# Patient Record
Sex: FEMALE | Race: NATIVE HAWAIIAN OR OTHER PACIFIC ISLANDER | NOT HISPANIC OR LATINO | Employment: UNEMPLOYED | URBAN - METROPOLITAN AREA
[De-identification: names, ages, dates, MRNs, and addresses within clinical notes are randomized per-mention and may not be internally consistent; named-entity substitution may affect disease eponyms.]

---

## 2023-09-14 ENCOUNTER — APPOINTMENT (OUTPATIENT)
Dept: RADIOLOGY | Facility: IMAGING CENTER | Age: 33
End: 2023-09-14
Payer: MEDICAID

## 2023-09-14 DIAGNOSIS — Z3A.20 20 WEEKS GESTATION OF PREGNANCY: ICD-10-CM

## 2023-09-14 PROCEDURE — 76805 OB US >/= 14 WKS SNGL FETUS: CPT | Mod: TC | Performed by: OBSTETRICS & GYNECOLOGY

## 2023-09-20 ENCOUNTER — APPOINTMENT (OUTPATIENT)
Dept: OBGYN | Facility: CLINIC | Age: 33
End: 2023-09-20

## 2023-09-20 ENCOUNTER — INITIAL PRENATAL (OUTPATIENT)
Dept: OBGYN | Facility: CLINIC | Age: 33
End: 2023-09-20

## 2023-09-20 ENCOUNTER — HOSPITAL ENCOUNTER (OUTPATIENT)
Facility: MEDICAL CENTER | Age: 33
End: 2023-09-20
Attending: NURSE PRACTITIONER
Payer: MEDICAID

## 2023-09-20 VITALS — BODY MASS INDEX: 32.44 KG/M2 | WEIGHT: 189 LBS | DIASTOLIC BLOOD PRESSURE: 60 MMHG | SYSTOLIC BLOOD PRESSURE: 100 MMHG

## 2023-09-20 DIAGNOSIS — Z34.82 ENCOUNTER FOR SUPERVISION OF OTHER NORMAL PREGNANCY IN SECOND TRIMESTER: ICD-10-CM

## 2023-09-20 DIAGNOSIS — Z34.82 ENCOUNTER FOR SUPERVISION OF OTHER NORMAL PREGNANCY IN SECOND TRIMESTER: Primary | ICD-10-CM

## 2023-09-20 PROBLEM — Z34.90 SUPERVISION OF NORMAL PREGNANCY: Status: ACTIVE | Noted: 2023-09-20

## 2023-09-20 PROCEDURE — 3078F DIAST BP <80 MM HG: CPT | Performed by: NURSE PRACTITIONER

## 2023-09-20 PROCEDURE — 87591 N.GONORRHOEAE DNA AMP PROB: CPT

## 2023-09-20 PROCEDURE — 88175 CYTOPATH C/V AUTO FLUID REDO: CPT

## 2023-09-20 PROCEDURE — 87624 HPV HI-RISK TYP POOLED RSLT: CPT

## 2023-09-20 PROCEDURE — 87491 CHLMYD TRACH DNA AMP PROBE: CPT

## 2023-09-20 PROCEDURE — 3074F SYST BP LT 130 MM HG: CPT | Performed by: NURSE PRACTITIONER

## 2023-09-20 PROCEDURE — 0501F PRENATAL FLOW SHEET: CPT | Performed by: NURSE PRACTITIONER

## 2023-09-20 ASSESSMENT — ENCOUNTER SYMPTOMS
RESPIRATORY NEGATIVE: 1
CONSTITUTIONAL NEGATIVE: 1
PSYCHIATRIC NEGATIVE: 1
NEUROLOGICAL NEGATIVE: 1
EYES NEGATIVE: 1
GASTROINTESTINAL NEGATIVE: 1
MUSCULOSKELETAL NEGATIVE: 1
CARDIOVASCULAR NEGATIVE: 1

## 2023-09-20 NOTE — PROGRESS NOTES
NOB today  LMP: sometime in march  Last pap: pap today  Phone # [phone] (home)    Pharmacy confirmed  On PNV  Genetic screening   c/o

## 2023-09-20 NOTE — PROGRESS NOTES
S:  Karlene Arriaza is a 32 y.o.  who presents for her new OB exam.  She is 25w2d with and LUCINDA of Estimated Date of Delivery: 24 based off of US . She has no complaints.  [place] Discussed heavy lifting and chemical exposure. No ER visits or previous care in this pregnancy.     US done and overall normal. Listed as 1% for EFW, but likely due to incorrect dating. However, HC is 2 wks > AC. Reviewed with patient, referral to perinatology.    Too late for AFP, declines NIPT.  Declines CF.  Denies VB, LOF, or cramping.  Denies dysuria, vaginal DC. Reports good fetal movement.     Pt is  and lives with  and children.  Pregnancy is unplanned but desired. Desires BTL after delivery.    She has a history of 5 full term 's without any pregnancy or delivery complications.      Discussed diet and exercise during pregnancy. Encouraged good nutrition, and daily exercise including walking or swimming. Discussed expected weight gain during pregnancy. Discussed adequate hydration during pregnancy.    Past Medical History:   Diagnosis Date    Pregnant      Family History   Problem Relation Age of Onset    No Known Problems Mother     Diabetes Father      Social History     Socioeconomic History    Marital status:      Spouse name: Not on file    Number of children: Not on file    Years of education: Not on file    Highest education level: Not on file   Occupational History    Not on file   Tobacco Use    Smoking status: Never    Smokeless tobacco: Not on file   Vaping Use    Vaping Use: Never used   Substance and Sexual Activity    Alcohol use: Not Currently    Drug use: Never    Sexual activity: Yes   Other Topics Concern    Not on file   Social History Narrative    Not on file     Social Determinants of Health     Financial Resource Strain: Not on file   Food Insecurity: Not on file   Transportation Needs: Not on file   Physical Activity: Not on file   Stress: Not on  file   Social Connections: Not on file   Intimate Partner Violence: Not on file   Housing Stability: Not on file     OB History    Para Term  AB Living   6 5 5     5   SAB IAB Ectopic Molar Multiple Live Births             5      # Outcome Date GA Lbr Driss/2nd Weight Sex Delivery Anes PTL Lv   6 Current            5 Term  40w0d  5 lb F Vag-Spont   SANDHYA   4 Term  40w0d  6 lb F Vag-Spont   SANDHYA   3 Term  40w0d  5 lb F Vag-Spont   SANDHYA   2 Term  40w0d  5 lb F Vag-Spont   SANDHYA   1 Term  40w0d  6 lb M Vag-Spont   SANDHYA       History of Varicella Virus: no  History of HSV I or II in self or partner: no  History of Thyroid problems: no    O:  /60   Wt 189 lb    See Prenatal Physical.    Wet mount: deferred, no s/sx  Chaperone offered: yes    A:   1.  IUP @ 25w2d per US        2.  S=D        3.  See problem list below        4.  Late prenatal care       Patient Active Problem List    Diagnosis Date Noted    Supervision of normal pregnancy 2023         P:  1.  GC/CT & pap done        2.  Prenatal labs ordered - lab slip given        3.  Discussed PNV, diet, avoidances and adequate water intake        4.  NOB packet given        5.  Return to office in 3 wks        6.  Complete OB US already done and WNL        7.  Glucose ordered today    Orders Placed This Encounter    PREG CNTR PRENATAL PN    URINE DRUG SCREEN    GLUCOSE 1HR GESTATIONAL    THINPREP PAP W/HPV AND CTNG    Referral to Perinatology    Prenatal MV-Min-Fe Fum-FA-DHA (PRENATAL 1 PO)        US done 2023, patient unsure of LMP, but states regular monthly cycles    If US dating is used for percentiles, EFW is 18%, HC 73%, BPD 84%, AC 13%, and cerebellum is equal to 24w5d.   Likely dating issue, but due to head and abdomen discrepancy, referral to perinatology      HPI    Review of Systems   Constitutional: Negative.    HENT: Negative.     Eyes: Negative.    Respiratory: Negative.     Cardiovascular: Negative.     Gastrointestinal: Negative.    Genitourinary: Negative.    Musculoskeletal: Negative.    Skin: Negative.    Neurological: Negative.    Endo/Heme/Allergies: Negative.    Psychiatric/Behavioral: Negative.     All other systems reviewed and are negative.      Objective     /60   Wt 189 lb   LMP 03/05/2023 (Approximate)   BMI 32.44 kg/m²      Physical Exam  Vitals and nursing note reviewed. Exam conducted with a chaperone present.   Constitutional:       Appearance: Normal appearance. She is normal weight.   HENT:      Head: Normocephalic.   Eyes:      Extraocular Movements: Extraocular movements intact.   Cardiovascular:      Rate and Rhythm: Normal rate and regular rhythm.      Pulses: Normal pulses.      Heart sounds: Normal heart sounds.   Pulmonary:      Effort: Pulmonary effort is normal.   Abdominal:      Palpations: Abdomen is soft.   Genitourinary:     General: Normal vulva.      Rectum: Normal.   Musculoskeletal:         General: Normal range of motion.      Cervical back: Normal range of motion and neck supple.   Skin:     General: Skin is warm and dry.      Capillary Refill: Capillary refill takes less than 2 seconds.   Neurological:      Mental Status: She is alert.   Psychiatric:         Mood and Affect: Mood normal.         Behavior: Behavior normal.         Thought Content: Thought content normal.         Judgment: Judgment normal.       Assessment & Plan       1. Encounter for supervision of other normal pregnancy in second trimester  LUCINDA 1/1/2024 per US

## 2023-09-23 LAB
C TRACH RRNA CVX QL NAA+PROBE: NEGATIVE
CYTOLOGIST CVX/VAG CYTO: ABNORMAL
CYTOLOGY CVX/VAG DOC CYTO: ABNORMAL
CYTOLOGY CVX/VAG DOC THIN PREP: ABNORMAL
DX ICD CODE: ABNORMAL
HPV I/H RISK 4 DNA CVX QL PROBE+SIG AMP: NEGATIVE
N GONORRHOEA RRNA CVX QL NAA+PROBE: NEGATIVE
NOTE NL11727A: ABNORMAL
OTHER STN SPEC: ABNORMAL
PATHOLOGIST CVX/VAG CYTO: ABNORMAL
STAT OF ADQ CVX/VAG CYTO-IMP: ABNORMAL

## 2023-10-03 ENCOUNTER — HOSPITAL ENCOUNTER (OUTPATIENT)
Dept: LAB | Facility: MEDICAL CENTER | Age: 33
End: 2023-10-03
Attending: NURSE PRACTITIONER
Payer: MEDICAID

## 2023-10-03 DIAGNOSIS — Z34.82 ENCOUNTER FOR SUPERVISION OF OTHER NORMAL PREGNANCY IN SECOND TRIMESTER: ICD-10-CM

## 2023-10-03 LAB
ABO GROUP BLD: NORMAL
BLD GP AB SCN SERPL QL: NORMAL
ERYTHROCYTE [DISTWIDTH] IN BLOOD BY AUTOMATED COUNT: 45.9 FL (ref 35.9–50)
GLUCOSE 1H P 50 G GLC PO SERPL-MCNC: 93 MG/DL (ref 70–139)
HBV SURFACE AG SER QL: ABNORMAL
HCT VFR BLD AUTO: 33.9 % (ref 37–47)
HCV AB SER QL: ABNORMAL
HGB BLD-MCNC: 10.6 G/DL (ref 12–16)
HIV 1+2 AB+HIV1 P24 AG SERPL QL IA: NORMAL
MCH RBC QN AUTO: 26 PG (ref 27–33)
MCHC RBC AUTO-ENTMCNC: 31.3 G/DL (ref 32.2–35.5)
MCV RBC AUTO: 83.3 FL (ref 81.4–97.8)
PLATELET # BLD AUTO: 275 K/UL (ref 164–446)
PMV BLD AUTO: 10.3 FL (ref 9–12.9)
RBC # BLD AUTO: 4.07 M/UL (ref 4.2–5.4)
RH BLD: NORMAL
RUBV AB SER QL: 11 IU/ML
T PALLIDUM AB SER QL IA: ABNORMAL
WBC # BLD AUTO: 8 K/UL (ref 4.8–10.8)

## 2023-10-03 PROCEDURE — 86900 BLOOD TYPING SEROLOGIC ABO: CPT

## 2023-10-03 PROCEDURE — 86901 BLOOD TYPING SEROLOGIC RH(D): CPT

## 2023-10-03 PROCEDURE — 87086 URINE CULTURE/COLONY COUNT: CPT

## 2023-10-03 PROCEDURE — 86762 RUBELLA ANTIBODY: CPT

## 2023-10-03 PROCEDURE — 87389 HIV-1 AG W/HIV-1&-2 AB AG IA: CPT

## 2023-10-03 PROCEDURE — 82950 GLUCOSE TEST: CPT

## 2023-10-03 PROCEDURE — 86850 RBC ANTIBODY SCREEN: CPT

## 2023-10-03 PROCEDURE — 36415 COLL VENOUS BLD VENIPUNCTURE: CPT

## 2023-10-03 PROCEDURE — 80307 DRUG TEST PRSMV CHEM ANLYZR: CPT

## 2023-10-03 PROCEDURE — 86803 HEPATITIS C AB TEST: CPT

## 2023-10-03 PROCEDURE — 87340 HEPATITIS B SURFACE AG IA: CPT

## 2023-10-03 PROCEDURE — 85027 COMPLETE CBC AUTOMATED: CPT

## 2023-10-03 PROCEDURE — 86780 TREPONEMA PALLIDUM: CPT

## 2023-10-05 LAB
AMPHET CTO UR CFM-MCNC: NEGATIVE NG/ML
BACTERIA UR CULT: NORMAL
BARBITURATES CTO UR CFM-MCNC: NEGATIVE NG/ML
BENZODIAZ CTO UR CFM-MCNC: NEGATIVE NG/ML
CANNABINOIDS CTO UR CFM-MCNC: NEGATIVE NG/ML
COCAINE CTO UR CFM-MCNC: NEGATIVE NG/ML
DRUG COMMENT 753798: NORMAL
METHADONE CTO UR CFM-MCNC: NEGATIVE NG/ML
OPIATES CTO UR CFM-MCNC: NEGATIVE NG/ML
PCP CTO UR CFM-MCNC: NEGATIVE NG/ML
PROPOXYPH CTO UR CFM-MCNC: NEGATIVE NG/ML
SIGNIFICANT IND 70042: NORMAL
SITE SITE: NORMAL
SOURCE SOURCE: NORMAL

## 2023-10-09 ENCOUNTER — ROUTINE PRENATAL (OUTPATIENT)
Dept: OBGYN | Facility: CLINIC | Age: 33
End: 2023-10-09
Payer: MEDICAID

## 2023-10-09 ENCOUNTER — TELEPHONE (OUTPATIENT)
Dept: OBGYN | Facility: CLINIC | Age: 33
End: 2023-10-09

## 2023-10-09 VITALS — BODY MASS INDEX: 32.61 KG/M2 | WEIGHT: 190 LBS | DIASTOLIC BLOOD PRESSURE: 70 MMHG | SYSTOLIC BLOOD PRESSURE: 110 MMHG

## 2023-10-09 DIAGNOSIS — O09.30 LATE PRENATAL CARE: ICD-10-CM

## 2023-10-09 DIAGNOSIS — Z64.1 GRAND MULTIPARA: ICD-10-CM

## 2023-10-09 DIAGNOSIS — O09.522 MULTIGRAVIDA OF ADVANCED MATERNAL AGE IN SECOND TRIMESTER: ICD-10-CM

## 2023-10-09 DIAGNOSIS — R87.610 ASCUS OF CERVIX WITH NEGATIVE HIGH RISK HPV: ICD-10-CM

## 2023-10-09 PROBLEM — O09.529 ADVANCED MATERNAL AGE IN MULTIGRAVIDA: Status: ACTIVE | Noted: 2023-10-09

## 2023-10-09 PROCEDURE — 90472 IMMUNIZATION ADMIN EACH ADD: CPT | Performed by: NURSE PRACTITIONER

## 2023-10-09 PROCEDURE — 3074F SYST BP LT 130 MM HG: CPT | Performed by: NURSE PRACTITIONER

## 2023-10-09 PROCEDURE — 3078F DIAST BP <80 MM HG: CPT | Performed by: NURSE PRACTITIONER

## 2023-10-09 PROCEDURE — 0502F SUBSEQUENT PRENATAL CARE: CPT | Performed by: NURSE PRACTITIONER

## 2023-10-09 PROCEDURE — 90686 IIV4 VACC NO PRSV 0.5 ML IM: CPT | Performed by: NURSE PRACTITIONER

## 2023-10-09 PROCEDURE — 90715 TDAP VACCINE 7 YRS/> IM: CPT | Performed by: NURSE PRACTITIONER

## 2023-10-09 PROCEDURE — 90471 IMMUNIZATION ADMIN: CPT | Performed by: NURSE PRACTITIONER

## 2023-10-09 NOTE — PROGRESS NOTES
Pt. Here for OB F/U.   Reports good FM.   Pt. Denies VB, LOF, or UC's.   MINERVA explained and given today.  Tdap and Flu given.   BTL signed.   Chaperone offered.   Pt states she has no concerns.

## 2023-10-09 NOTE — LETTER
"Count Your Baby's Movements  Another step to a healthy delivery      How Many Weeks Pregnant? 27w6d    Date to Begin Counting: Today!              How to use this chart    One way for your physician to keep track of your baby's health is by knowing how often the baby moves (or \"kicks\") in your womb.  You can help your physician to do this by using this chart every day.    Every day, you should see how many hours it takes for your baby to move 10 times.  Start in the morning, as soon as you get up.    First, write down the time your baby moves until you get to 10.  Check off one box every time your baby moves until you get to 10.  Write down the time you finished counting in the last column.  Total how long it took to count up all 10 movements.  Finally, fill in the box that shows how long this took.  After counting 10 movements, you no longer have to count any more that day.  The next morning, just start counting again as soon as you get up.    What should you call a \"movement\"?  It is hard to say, because it will feel different from one mother to another and from one pregnancy to the next.  The important thing is that you count the movements the same way throughout your pregnancy.  If you have more questions, you should ask your physician.    Count carefully every day!  SAMPLE:  Week 28    How many hours did it take to feel 10 movements?       Start  Time     1     2     3     4     5     6     7     8     9     10   Finish Time   Mon 8:20           11:40   Tue Wed Thu               Fri               Sat               Sun                 IMPORTANT: You should contact your physician if it takes more than two hours for you to feel 10 movements.  Each morning, write down the time and start to count the movements of your baby.  Keep track by checking off one box every time you feel one movement.  When you have felt 10 \"kicks\", write down the time you finished counting in the last column.  " Then fill in the   box (over the check radha) for the number of hours it took.  Be sure to read the complete instructions on the previous page.

## 2023-10-09 NOTE — PROGRESS NOTES
SUBJECTIVE:  Pt is a 33 y.o.   at 27w6d  gestation. Presents today for follow-up prenatal care. Reports good  fetal movement. Denies regular cramping/contractions, bleeding or leaking of fluid. Denies dysuria, headaches, N/V. Generally feels well today.      OBJECTIVE:  - See prenatal vitals flow  -   Vitals:    10/09/23 0855   BP: 110/70   Weight: 190 lb                 ASSESSMENT:   - IUP at 27w6d    - S=D   -   Patient Active Problem List    Diagnosis Date Noted    Advanced maternal age in multigravida 10/09/2023    Grand multipara 10/09/2023    Late prenatal care @ 25 weeks 10/09/2023    ASCUS of cervix with negative high risk HPV 10/09/2023         PLAN:  - S/sx pregnancy and labor warning signs vs general discomforts discussed  - Fetal movements and/or kick counts reviewed   - Adequate hydration reinforced  - Nutrition/exercise/vitamin education; continue PNV  -  Has not gotten call from [facility] for appt, will have RN coordinate this  - S/p Tdap and flu vacc today  - Anticipatory guidance given  - RTC in 3 weeks for follow-up prenatal care

## 2023-10-09 NOTE — TELEPHONE ENCOUNTER
10/9/2023 1114  RODGER Davis asked this RN to call pt and f/u on referral to [facility]. Pt has not heard from the office yet. Gave pt phone number [phone] to [facility] office. Encouraged her to call and set up appt. Pt agreed and verbalized understanding.

## 2023-11-03 ENCOUNTER — ROUTINE PRENATAL (OUTPATIENT)
Dept: OBGYN | Facility: CLINIC | Age: 33
End: 2023-11-03
Payer: MEDICAID

## 2023-11-03 VITALS — SYSTOLIC BLOOD PRESSURE: 110 MMHG | BODY MASS INDEX: 34.16 KG/M2 | DIASTOLIC BLOOD PRESSURE: 70 MMHG | WEIGHT: 199 LBS

## 2023-11-03 DIAGNOSIS — O09.523 MULTIGRAVIDA OF ADVANCED MATERNAL AGE IN THIRD TRIMESTER: ICD-10-CM

## 2023-11-03 PROCEDURE — 3078F DIAST BP <80 MM HG: CPT | Performed by: NURSE PRACTITIONER

## 2023-11-03 PROCEDURE — 0502F SUBSEQUENT PRENATAL CARE: CPT | Performed by: NURSE PRACTITIONER

## 2023-11-03 PROCEDURE — 3074F SYST BP LT 130 MM HG: CPT | Performed by: NURSE PRACTITIONER

## 2023-11-03 NOTE — PROGRESS NOTES
Pt. Here for OB/FU.   Reports Good FM.   Pt. Denies VB, LOF, or UC's.   Chaperone offered.   BTL signed.   Pt states she would like to know what position baby is in.

## 2023-11-03 NOTE — PROGRESS NOTES
SUBJECTIVE:  Pt is a 33 y.o.   at 31w3d  gestation. Presents today for follow-up prenatal care. Reports good  fetal movement. Denies regular cramping/contractions, bleeding or leaking of fluid. Denies dysuria, headaches, N/V. Generally feels well today .     OBJECTIVE:  - See prenatal vitals flow  -   Vitals:    23 0732   BP: 110/70   Weight: 199 lb                 ASSESSMENT:   - IUP at 31w3d    - S=D   -   Patient Active Problem List    Diagnosis Date Noted    Advanced maternal age in multigravida 10/09/2023    Grand multipara 10/09/2023    Late prenatal care @ 25 weeks 10/09/2023    ASCUS of cervix with negative high risk HPV 10/09/2023         PLAN:  - S/sx pregnancy and labor warning signs vs general discomforts discussed  - Fetal movements and/or kick counts reviewed   - Adequate hydration reinforced  - Nutrition/exercise/vitamin education; continue PNV  - Desires BTL if c/s  - RN will help pt get appt with MFM as still has not been able to  - Anticipatory guidance given  - RTC in 2 weeks for follow-up prenatal care

## 2023-11-08 ENCOUNTER — TELEPHONE (OUTPATIENT)
Dept: OBGYN | Facility: CLINIC | Age: 33
End: 2023-11-08
Payer: MEDICAID

## 2023-11-08 NOTE — TELEPHONE ENCOUNTER
11/8/2023 1130  Called pt to see if she had gotten a call from Dr Michel's office. She had not. Gave pt phone number [phone] to office. Pt verbalized understanding.

## 2023-11-22 ENCOUNTER — ROUTINE PRENATAL (OUTPATIENT)
Dept: OBGYN | Facility: CLINIC | Age: 33
End: 2023-11-22
Payer: MEDICAID

## 2023-11-22 VITALS — WEIGHT: 208 LBS | BODY MASS INDEX: 35.7 KG/M2 | DIASTOLIC BLOOD PRESSURE: 100 MMHG | SYSTOLIC BLOOD PRESSURE: 126 MMHG

## 2023-11-22 DIAGNOSIS — O09.523 MULTIGRAVIDA OF ADVANCED MATERNAL AGE IN THIRD TRIMESTER: ICD-10-CM

## 2023-11-22 PROCEDURE — 3080F DIAST BP >= 90 MM HG: CPT | Performed by: NURSE PRACTITIONER

## 2023-11-22 PROCEDURE — 0502F SUBSEQUENT PRENATAL CARE: CPT | Performed by: NURSE PRACTITIONER

## 2023-11-22 PROCEDURE — 3074F SYST BP LT 130 MM HG: CPT | Performed by: NURSE PRACTITIONER

## 2023-11-22 RX ORDER — FERROUS SULFATE 325(65) MG
325 TABLET ORAL DAILY
COMMUNITY

## 2023-11-22 NOTE — PROGRESS NOTES
SUBJECTIVE:  Pt is a 33 y.o.   at 34w1d  gestation. Presents today for follow-up prenatal care. Reports good  fetal movement. Denies regular cramping/contractions, bleeding or leaking of fluid. Denies dysuria, headaches, N/V. Generally feels well today.     OBJECTIVE:  - See prenatal vitals flow     ASSESSMENT:   - IUP at 34w1d    - S=D   -   Patient Active Problem List    Diagnosis Date Noted    Advanced maternal age in multigravida 10/09/2023    Grand multipara 10/09/2023    Late prenatal care @ 25 weeks 10/09/2023    ASCUS of cervix with negative high risk HPV 10/09/2023         PLAN:  - S/sx pregnancy and labor warning signs vs general discomforts discussed  - Fetal movements and/or kick counts reviewed   - Adequate hydration reinforced  - Nutrition/exercise/vitamin education; continue PNV  - Anticipatory guidance given  - RTC in 2 weeks for follow-up prenatal care/NST

## 2023-11-22 NOTE — PROGRESS NOTES
Pt. Here for OB/FU.   Reports Good FM.   Pt. Denies VB or LOF.   Chaperone offered and indicated.   BP Retake : 124/84  Labs up to date.   Pt states she has been having some contractions here and there.

## 2023-11-28 ENCOUNTER — ANESTHESIA (OUTPATIENT)
Dept: ANESTHESIOLOGY | Facility: MEDICAL CENTER | Age: 33
End: 2023-11-28
Payer: MEDICAID

## 2023-11-28 ENCOUNTER — ROUTINE PRENATAL (OUTPATIENT)
Dept: OBGYN | Facility: CLINIC | Age: 33
End: 2023-11-28

## 2023-11-28 ENCOUNTER — HOSPITAL ENCOUNTER (INPATIENT)
Facility: MEDICAL CENTER | Age: 33
LOS: 3 days | End: 2023-12-01
Attending: OBSTETRICS & GYNECOLOGY | Admitting: OBSTETRICS & GYNECOLOGY
Payer: MEDICAID

## 2023-11-28 ENCOUNTER — ANESTHESIA EVENT (OUTPATIENT)
Dept: ANESTHESIOLOGY | Facility: MEDICAL CENTER | Age: 33
End: 2023-11-28
Payer: MEDICAID

## 2023-11-28 VITALS — BODY MASS INDEX: 37.08 KG/M2 | DIASTOLIC BLOOD PRESSURE: 92 MMHG | WEIGHT: 216 LBS | SYSTOLIC BLOOD PRESSURE: 140 MMHG

## 2023-11-28 DIAGNOSIS — Z91.89 AT RISK FOR INEFFECTIVE BREASTFEEDING: Primary | ICD-10-CM

## 2023-11-28 DIAGNOSIS — O13.3 GESTATIONAL HYPERTENSION, THIRD TRIMESTER: ICD-10-CM

## 2023-11-28 DIAGNOSIS — Z34.83 ENCOUNTER FOR SUPERVISION OF OTHER NORMAL PREGNANCY IN THIRD TRIMESTER: Primary | ICD-10-CM

## 2023-11-28 LAB
ABO GROUP BLD: NORMAL
ALBUMIN SERPL BCP-MCNC: 3.2 G/DL (ref 3.2–4.9)
ALBUMIN/GLOB SERPL: 0.9 G/DL
ALP SERPL-CCNC: 111 U/L (ref 30–99)
ALT SERPL-CCNC: 7 U/L (ref 2–50)
AMPHET UR QL SCN: NEGATIVE
ANION GAP SERPL CALC-SCNC: 12 MMOL/L (ref 7–16)
AST SERPL-CCNC: 17 U/L (ref 12–45)
BARBITURATES UR QL SCN: NEGATIVE
BARCODED ABORH UBTYP: 6200
BARCODED PRD CODE UBPRD: NORMAL
BARCODED UNIT NUM UBUNT: NORMAL
BASOPHILS # BLD AUTO: 0.5 % (ref 0–1.8)
BASOPHILS # BLD: 0.05 K/UL (ref 0–0.12)
BENZODIAZ UR QL SCN: NEGATIVE
BILIRUB SERPL-MCNC: 0.5 MG/DL (ref 0.1–1.5)
BLD GP AB SCN SERPL QL: NORMAL
BUN SERPL-MCNC: 11 MG/DL (ref 8–22)
BZE UR QL SCN: NEGATIVE
CALCIUM ALBUM COR SERPL-MCNC: 9.2 MG/DL (ref 8.5–10.5)
CALCIUM SERPL-MCNC: 8.6 MG/DL (ref 8.5–10.5)
CANNABINOIDS UR QL SCN: NEGATIVE
CHLORIDE SERPL-SCNC: 106 MMOL/L (ref 96–112)
CO2 SERPL-SCNC: 18 MMOL/L (ref 20–33)
COMPONENT R 8504R: NORMAL
CREAT SERPL-MCNC: 0.65 MG/DL (ref 0.5–1.4)
CREAT UR-MCNC: 307.8 MG/DL
EOSINOPHIL # BLD AUTO: 0.25 K/UL (ref 0–0.51)
EOSINOPHIL NFR BLD: 2.5 % (ref 0–6.9)
ERYTHROCYTE [DISTWIDTH] IN BLOOD BY AUTOMATED COUNT: 44.1 FL (ref 35.9–50)
FENTANYL UR QL: NEGATIVE
FLUAV RNA SPEC QL NAA+PROBE: NEGATIVE
FLUBV RNA SPEC QL NAA+PROBE: NEGATIVE
GFR SERPLBLD CREATININE-BSD FMLA CKD-EPI: 118 ML/MIN/1.73 M 2
GLOBULIN SER CALC-MCNC: 3.4 G/DL (ref 1.9–3.5)
GLUCOSE SERPL-MCNC: 62 MG/DL (ref 65–99)
GP B STREP DNA SPEC QL NAA+PROBE: NEGATIVE
HCT VFR BLD AUTO: 30.9 % (ref 37–47)
HGB BLD-MCNC: 9.6 G/DL (ref 12–16)
IMM GRANULOCYTES # BLD AUTO: 0.08 K/UL (ref 0–0.11)
IMM GRANULOCYTES NFR BLD AUTO: 0.8 % (ref 0–0.9)
LYMPHOCYTES # BLD AUTO: 0.98 K/UL (ref 1–4.8)
LYMPHOCYTES NFR BLD: 9.8 % (ref 22–41)
MAGNESIUM SERPL-MCNC: 1.9 MG/DL (ref 1.5–2.5)
MAGNESIUM SERPL-MCNC: 4.2 MG/DL (ref 1.5–2.5)
MCH RBC QN AUTO: 23.9 PG (ref 27–33)
MCHC RBC AUTO-ENTMCNC: 31.1 G/DL (ref 32.2–35.5)
MCV RBC AUTO: 77.1 FL (ref 81.4–97.8)
METHADONE UR QL SCN: NEGATIVE
MONOCYTES # BLD AUTO: 0.69 K/UL (ref 0–0.85)
MONOCYTES NFR BLD AUTO: 6.9 % (ref 0–13.4)
NEUTROPHILS # BLD AUTO: 8 K/UL (ref 1.82–7.42)
NEUTROPHILS NFR BLD: 79.5 % (ref 44–72)
NRBC # BLD AUTO: 0 K/UL
NRBC BLD-RTO: 0 /100 WBC (ref 0–0.2)
OPIATES UR QL SCN: NEGATIVE
OXYCODONE UR QL SCN: NEGATIVE
PCP UR QL SCN: NEGATIVE
PLATELET # BLD AUTO: 258 K/UL (ref 164–446)
PMV BLD AUTO: 10.1 FL (ref 9–12.9)
POTASSIUM SERPL-SCNC: 3.8 MMOL/L (ref 3.6–5.5)
PRODUCT TYPE UPROD: NORMAL
PROPOXYPH UR QL SCN: NEGATIVE
PROT SERPL-MCNC: 6.6 G/DL (ref 6–8.2)
PROT UR-MCNC: >600 MG/DL (ref 0–15)
PROT/CREAT UR: ABNORMAL MG/G (ref 10–107)
RBC # BLD AUTO: 4.01 M/UL (ref 4.2–5.4)
RH BLD: NORMAL
RSV RNA SPEC QL NAA+PROBE: NEGATIVE
SARS-COV-2 RNA RESP QL NAA+PROBE: DETECTED
SODIUM SERPL-SCNC: 136 MMOL/L (ref 135–145)
SPECIMEN SOURCE: ABNORMAL
T PALLIDUM AB SER QL IA: NORMAL
UNIT STATUS USTAT: NORMAL
WBC # BLD AUTO: 10.1 K/UL (ref 4.8–10.8)

## 2023-11-28 PROCEDURE — A9270 NON-COVERED ITEM OR SERVICE: HCPCS | Mod: UD | Performed by: OBSTETRICS & GYNECOLOGY

## 2023-11-28 PROCEDURE — 83735 ASSAY OF MAGNESIUM: CPT

## 2023-11-28 PROCEDURE — 700102 HCHG RX REV CODE 250 W/ 637 OVERRIDE(OP): Mod: UD

## 2023-11-28 PROCEDURE — 87081 CULTURE SCREEN ONLY: CPT

## 2023-11-28 PROCEDURE — 36415 COLL VENOUS BLD VENIPUNCTURE: CPT

## 2023-11-28 PROCEDURE — 3080F DIAST BP >= 90 MM HG: CPT | Performed by: NURSE PRACTITIONER

## 2023-11-28 PROCEDURE — 10907ZC DRAINAGE OF AMNIOTIC FLUID, THERAPEUTIC FROM PRODUCTS OF CONCEPTION, VIA NATURAL OR ARTIFICIAL OPENING: ICD-10-PCS | Performed by: OBSTETRICS & GYNECOLOGY

## 2023-11-28 PROCEDURE — 3077F SYST BP >= 140 MM HG: CPT | Performed by: NURSE PRACTITIONER

## 2023-11-28 PROCEDURE — 80053 COMPREHEN METABOLIC PANEL: CPT

## 2023-11-28 PROCEDURE — 700102 HCHG RX REV CODE 250 W/ 637 OVERRIDE(OP): Performed by: NURSE PRACTITIONER

## 2023-11-28 PROCEDURE — 700111 HCHG RX REV CODE 636 W/ 250 OVERRIDE (IP): Performed by: ANESTHESIOLOGY

## 2023-11-28 PROCEDURE — 86900 BLOOD TYPING SEROLOGIC ABO: CPT

## 2023-11-28 PROCEDURE — 770002 HCHG ROOM/CARE - OB PRIVATE (112)

## 2023-11-28 PROCEDURE — 700111 HCHG RX REV CODE 636 W/ 250 OVERRIDE (IP): Performed by: OBSTETRICS & GYNECOLOGY

## 2023-11-28 PROCEDURE — 303615 HCHG EPIDURAL/SPINAL ANESTHESIA FOR LABOR

## 2023-11-28 PROCEDURE — 3E033VJ INTRODUCTION OF OTHER HORMONE INTO PERIPHERAL VEIN, PERCUTANEOUS APPROACH: ICD-10-PCS | Performed by: OBSTETRICS & GYNECOLOGY

## 2023-11-28 PROCEDURE — 0502F SUBSEQUENT PRENATAL CARE: CPT | Performed by: NURSE PRACTITIONER

## 2023-11-28 PROCEDURE — 82570 ASSAY OF URINE CREATININE: CPT

## 2023-11-28 PROCEDURE — 700101 HCHG RX REV CODE 250: Performed by: OBSTETRICS & GYNECOLOGY

## 2023-11-28 PROCEDURE — A9270 NON-COVERED ITEM OR SERVICE: HCPCS | Mod: UD

## 2023-11-28 PROCEDURE — A9270 NON-COVERED ITEM OR SERVICE: HCPCS | Performed by: NURSE PRACTITIONER

## 2023-11-28 PROCEDURE — 87150 DNA/RNA AMPLIFIED PROBE: CPT

## 2023-11-28 PROCEDURE — 700105 HCHG RX REV CODE 258: Performed by: OBSTETRICS & GYNECOLOGY

## 2023-11-28 PROCEDURE — 86850 RBC ANTIBODY SCREEN: CPT

## 2023-11-28 PROCEDURE — 0241U HCHG SARS-COV-2 COVID-19 NFCT DS RESP RNA 4 TRGT MIC: CPT

## 2023-11-28 PROCEDURE — 700105 HCHG RX REV CODE 258: Performed by: ANESTHESIOLOGY

## 2023-11-28 PROCEDURE — 87653 STREP B DNA AMP PROBE: CPT

## 2023-11-28 PROCEDURE — C9803 HOPD COVID-19 SPEC COLLECT: HCPCS

## 2023-11-28 PROCEDURE — 700102 HCHG RX REV CODE 250 W/ 637 OVERRIDE(OP): Mod: UD | Performed by: OBSTETRICS & GYNECOLOGY

## 2023-11-28 PROCEDURE — 86780 TREPONEMA PALLIDUM: CPT

## 2023-11-28 PROCEDURE — 86901 BLOOD TYPING SEROLOGIC RH(D): CPT

## 2023-11-28 PROCEDURE — 85025 COMPLETE CBC W/AUTO DIFF WBC: CPT

## 2023-11-28 PROCEDURE — 700101 HCHG RX REV CODE 250: Performed by: ANESTHESIOLOGY

## 2023-11-28 PROCEDURE — 80307 DRUG TEST PRSMV CHEM ANLYZR: CPT

## 2023-11-28 PROCEDURE — 700111 HCHG RX REV CODE 636 W/ 250 OVERRIDE (IP): Mod: JZ

## 2023-11-28 PROCEDURE — 84156 ASSAY OF PROTEIN URINE: CPT

## 2023-11-28 RX ORDER — BUPIVACAINE HYDROCHLORIDE 2.5 MG/ML
INJECTION, SOLUTION EPIDURAL; INFILTRATION; INTRACAUDAL
Status: COMPLETED | OUTPATIENT
Start: 2023-11-28 | End: 2023-11-28

## 2023-11-28 RX ORDER — IBUPROFEN 800 MG/1
800 TABLET ORAL
Status: COMPLETED | OUTPATIENT
Start: 2023-11-28 | End: 2023-11-29

## 2023-11-28 RX ORDER — OXYTOCIN 10 [USP'U]/ML
10 INJECTION, SOLUTION INTRAMUSCULAR; INTRAVENOUS
Status: DISCONTINUED | OUTPATIENT
Start: 2023-11-28 | End: 2023-11-29 | Stop reason: HOSPADM

## 2023-11-28 RX ORDER — SODIUM CHLORIDE, SODIUM LACTATE, POTASSIUM CHLORIDE, CALCIUM CHLORIDE 600; 310; 30; 20 MG/100ML; MG/100ML; MG/100ML; MG/100ML
INJECTION, SOLUTION INTRAVENOUS CONTINUOUS
Status: DISCONTINUED | OUTPATIENT
Start: 2023-11-28 | End: 2023-11-29

## 2023-11-28 RX ORDER — ACETAMINOPHEN 325 MG/1
650 TABLET ORAL ONCE
Status: COMPLETED | OUTPATIENT
Start: 2023-11-28 | End: 2023-11-28

## 2023-11-28 RX ORDER — TERBUTALINE SULFATE 1 MG/ML
0.25 INJECTION, SOLUTION SUBCUTANEOUS
Status: DISCONTINUED | OUTPATIENT
Start: 2023-11-28 | End: 2023-11-29 | Stop reason: HOSPADM

## 2023-11-28 RX ORDER — MISOPROSTOL 200 UG/1
800 TABLET ORAL
Status: DISCONTINUED | OUTPATIENT
Start: 2023-11-28 | End: 2023-11-29

## 2023-11-28 RX ORDER — LABETALOL HYDROCHLORIDE 5 MG/ML
20-80 INJECTION, SOLUTION INTRAVENOUS
Status: DISCONTINUED | OUTPATIENT
Start: 2023-11-28 | End: 2023-11-29 | Stop reason: HOSPADM

## 2023-11-28 RX ORDER — BUPIVACAINE HYDROCHLORIDE 2.5 MG/ML
INJECTION, SOLUTION EPIDURAL; INFILTRATION; INTRACAUDAL
Status: COMPLETED
Start: 2023-11-28 | End: 2023-11-28

## 2023-11-28 RX ORDER — SODIUM CHLORIDE, SODIUM LACTATE, POTASSIUM CHLORIDE, AND CALCIUM CHLORIDE .6; .31; .03; .02 G/100ML; G/100ML; G/100ML; G/100ML
250 INJECTION, SOLUTION INTRAVENOUS PRN
Status: DISCONTINUED | OUTPATIENT
Start: 2023-11-28 | End: 2023-11-29 | Stop reason: HOSPADM

## 2023-11-28 RX ORDER — EPHEDRINE SULFATE 50 MG/ML
5 INJECTION, SOLUTION INTRAVENOUS
Status: DISCONTINUED | OUTPATIENT
Start: 2023-11-28 | End: 2023-11-29 | Stop reason: HOSPADM

## 2023-11-28 RX ORDER — CALCIUM GLUCONATE 94 MG/ML
1 INJECTION, SOLUTION INTRAVENOUS
Status: DISCONTINUED | OUTPATIENT
Start: 2023-11-28 | End: 2023-11-29 | Stop reason: HOSPADM

## 2023-11-28 RX ORDER — MAGNESIUM SULFATE HEPTAHYDRATE 40 MG/ML
4 INJECTION, SOLUTION INTRAVENOUS ONCE
Status: COMPLETED | OUTPATIENT
Start: 2023-11-28 | End: 2023-11-28

## 2023-11-28 RX ORDER — LIDOCAINE HYDROCHLORIDE 10 MG/ML
20 INJECTION, SOLUTION INFILTRATION; PERINEURAL
Status: DISCONTINUED | OUTPATIENT
Start: 2023-11-28 | End: 2023-11-29 | Stop reason: HOSPADM

## 2023-11-28 RX ORDER — ACETAMINOPHEN 500 MG
1000 TABLET ORAL ONCE
Status: COMPLETED | OUTPATIENT
Start: 2023-11-28 | End: 2023-11-28

## 2023-11-28 RX ORDER — SODIUM CHLORIDE, SODIUM LACTATE, POTASSIUM CHLORIDE, AND CALCIUM CHLORIDE .6; .31; .03; .02 G/100ML; G/100ML; G/100ML; G/100ML
1000 INJECTION, SOLUTION INTRAVENOUS
Status: COMPLETED | OUTPATIENT
Start: 2023-11-28 | End: 2023-11-28

## 2023-11-28 RX ORDER — MAGNESIUM SULFATE HEPTAHYDRATE 40 MG/ML
2 INJECTION, SOLUTION INTRAVENOUS CONTINUOUS
Status: DISCONTINUED | OUTPATIENT
Start: 2023-11-28 | End: 2023-11-29

## 2023-11-28 RX ORDER — HYDRALAZINE HYDROCHLORIDE 20 MG/ML
5-10 INJECTION INTRAMUSCULAR; INTRAVENOUS
Status: DISCONTINUED | OUTPATIENT
Start: 2023-11-28 | End: 2023-11-29 | Stop reason: HOSPADM

## 2023-11-28 RX ORDER — LIDOCAINE HYDROCHLORIDE AND EPINEPHRINE 15; 5 MG/ML; UG/ML
INJECTION, SOLUTION EPIDURAL
Status: COMPLETED | OUTPATIENT
Start: 2023-11-28 | End: 2023-11-28

## 2023-11-28 RX ORDER — ROPIVACAINE HYDROCHLORIDE 2 MG/ML
INJECTION, SOLUTION EPIDURAL; INFILTRATION; PERINEURAL
Status: COMPLETED
Start: 2023-11-28 | End: 2023-11-28

## 2023-11-28 RX ORDER — ACETAMINOPHEN 500 MG
1000 TABLET ORAL
Status: DISCONTINUED | OUTPATIENT
Start: 2023-11-28 | End: 2023-11-29 | Stop reason: HOSPADM

## 2023-11-28 RX ORDER — NIFEDIPINE 10 MG/1
10 CAPSULE ORAL
Status: DISCONTINUED | OUTPATIENT
Start: 2023-11-28 | End: 2023-11-29 | Stop reason: HOSPADM

## 2023-11-28 RX ORDER — CARBOPROST TROMETHAMINE 250 UG/ML
250 INJECTION, SOLUTION INTRAMUSCULAR
Status: DISCONTINUED | OUTPATIENT
Start: 2023-11-28 | End: 2023-12-01 | Stop reason: HOSPADM

## 2023-11-28 RX ORDER — ROPIVACAINE HYDROCHLORIDE 2 MG/ML
INJECTION, SOLUTION EPIDURAL; INFILTRATION; PERINEURAL CONTINUOUS
Status: DISCONTINUED | OUTPATIENT
Start: 2023-11-28 | End: 2023-11-29

## 2023-11-28 RX ORDER — NIFEDIPINE 10 MG/1
CAPSULE ORAL
Status: COMPLETED
Start: 2023-11-28 | End: 2023-11-28

## 2023-11-28 RX ORDER — SODIUM CHLORIDE, SODIUM LACTATE, POTASSIUM CHLORIDE, CALCIUM CHLORIDE 600; 310; 30; 20 MG/100ML; MG/100ML; MG/100ML; MG/100ML
INJECTION, SOLUTION INTRAVENOUS CONTINUOUS
Status: DISCONTINUED | OUTPATIENT
Start: 2023-11-28 | End: 2023-11-30

## 2023-11-28 RX ADMIN — BUPIVACAINE HYDROCHLORIDE 7 ML: 2.5 INJECTION, SOLUTION EPIDURAL; INFILTRATION; INTRACAUDAL at 21:33

## 2023-11-28 RX ADMIN — MAGNESIUM SULFATE HEPTAHYDRATE 4 G: 4 INJECTION, SOLUTION INTRAVENOUS at 16:30

## 2023-11-28 RX ADMIN — LIDOCAINE HYDROCHLORIDE,EPINEPHRINE BITARTRATE 5 ML: 15; .005 INJECTION, SOLUTION EPIDURAL; INFILTRATION; INTRACAUDAL; PERINEURAL at 21:33

## 2023-11-28 RX ADMIN — ROPIVACAINE HYDROCHLORIDE 200 MG: 2 INJECTION, SOLUTION EPIDURAL; INFILTRATION at 21:54

## 2023-11-28 RX ADMIN — SODIUM CHLORIDE, POTASSIUM CHLORIDE, SODIUM LACTATE AND CALCIUM CHLORIDE 1000 ML: 600; 310; 30; 20 INJECTION, SOLUTION INTRAVENOUS at 20:50

## 2023-11-28 RX ADMIN — MAGNESIUM SULFATE HEPTAHYDRATE 2 G/HR: 40 INJECTION, SOLUTION INTRAVENOUS at 17:00

## 2023-11-28 RX ADMIN — OXYTOCIN 2 MILLI-UNITS/MIN: 10 INJECTION, SOLUTION INTRAMUSCULAR; INTRAVENOUS at 17:48

## 2023-11-28 RX ADMIN — WATER 2.5 MILLION UNITS: 1 INJECTION INTRAMUSCULAR; INTRAVENOUS; SUBCUTANEOUS at 20:08

## 2023-11-28 RX ADMIN — SODIUM CHLORIDE 5 MILLION UNITS: 900 INJECTION INTRAVENOUS at 16:45

## 2023-11-28 RX ADMIN — SODIUM CHLORIDE, POTASSIUM CHLORIDE, SODIUM LACTATE AND CALCIUM CHLORIDE: 600; 310; 30; 20 INJECTION, SOLUTION INTRAVENOUS at 21:28

## 2023-11-28 RX ADMIN — WATER 2.5 MILLION UNITS: 1 INJECTION INTRAMUSCULAR; INTRAVENOUS; SUBCUTANEOUS at 23:59

## 2023-11-28 RX ADMIN — NIFEDIPINE 10 MG: 10 CAPSULE ORAL at 15:36

## 2023-11-28 RX ADMIN — ROPIVACAINE HYDROCHLORIDE 200 MG: 2 INJECTION, SOLUTION EPIDURAL; INFILTRATION; PERINEURAL at 21:54

## 2023-11-28 RX ADMIN — ACETAMINOPHEN 650 MG: 325 TABLET, FILM COATED ORAL at 21:54

## 2023-11-28 RX ADMIN — ACETAMINOPHEN 1000 MG: 500 TABLET ORAL at 15:29

## 2023-11-28 RX ADMIN — SODIUM CHLORIDE, POTASSIUM CHLORIDE, SODIUM LACTATE AND CALCIUM CHLORIDE 1000 ML: 600; 310; 30; 20 INJECTION, SOLUTION INTRAVENOUS at 16:35

## 2023-11-28 ASSESSMENT — LIFESTYLE VARIABLES
TOTAL SCORE: 0
AVERAGE NUMBER OF DAYS PER WEEK YOU HAVE A DRINK CONTAINING ALCOHOL: 0
EVER FELT BAD OR GUILTY ABOUT YOUR DRINKING: NO
TOTAL SCORE: 0
DOES PATIENT WANT TO STOP DRINKING: NO
TOTAL SCORE: 0
EVER_SMOKED: NEVER
EVER HAD A DRINK FIRST THING IN THE MORNING TO STEADY YOUR NERVES TO GET RID OF A HANGOVER: NO
HAVE YOU EVER FELT YOU SHOULD CUT DOWN ON YOUR DRINKING: NO
ON A TYPICAL DAY WHEN YOU DRINK ALCOHOL HOW MANY DRINKS DO YOU HAVE: 0
CONSUMPTION TOTAL: NEGATIVE
ALCOHOL_USE: NO
HOW MANY TIMES IN THE PAST YEAR HAVE YOU HAD 5 OR MORE DRINKS IN A DAY: 0
HAVE PEOPLE ANNOYED YOU BY CRITICIZING YOUR DRINKING: NO

## 2023-11-28 ASSESSMENT — COPD QUESTIONNAIRES
HAVE YOU SMOKED AT LEAST 100 CIGARETTES IN YOUR ENTIRE LIFE: NO/DON'T KNOW
COPD SCREENING SCORE: 0
IN THE PAST 12 MONTHS DO YOU DO LESS THAN YOU USED TO BECAUSE OF YOUR BREATHING PROBLEMS: DISAGREE/UNSURE
DURING THE PAST 4 WEEKS HOW MUCH DID YOU FEEL SHORT OF BREATH: NONE/LITTLE OF THE TIME
DO YOU EVER COUGH UP ANY MUCUS OR PHLEGM?: NO/ONLY WITH OCCASIONAL COLDS OR INFECTIONS

## 2023-11-28 ASSESSMENT — PATIENT HEALTH QUESTIONNAIRE - PHQ9
SUM OF ALL RESPONSES TO PHQ9 QUESTIONS 1 AND 2: 0
1. LITTLE INTEREST OR PLEASURE IN DOING THINGS: NOT AT ALL
2. FEELING DOWN, DEPRESSED, IRRITABLE, OR HOPELESS: NOT AT ALL

## 2023-11-28 ASSESSMENT — PAIN DESCRIPTION - PAIN TYPE
TYPE: ACUTE PAIN

## 2023-11-28 ASSESSMENT — PAIN SCALES - GENERAL: PAINLEVEL: 5 - MODERATE PAIN

## 2023-11-28 NOTE — PROGRESS NOTES
1443: Pt is a  at 35.0 weeks today, pt presents to L&D after being sent over from the office following elevated BP's, 3+ pitting edema in lower extremities, and a HA. Pt reports +FM, denies VB/LOF/regular Uc's. EFM and TOCO applied, VS taken, pt comfortable in bed at this time.     1450: This RN updated Dr Trivedi on pt status/VS/FHT. POC discussed.     1455: This RN updated Dr Crespo and Dr Trivedi on BP, orders received for PIH labs and tylenol, see orders for details.     1505: This RN updated Dr Crespo and Dr Trivedi on BP. Dr Trivedi at bedside to assess pt.    1530: This RN updated Dr Crespo and Dr Trivedi on BP. Orders received to admit pt, see orders for details.     1545: Report given to April RN, POC discussed.

## 2023-11-28 NOTE — PROGRESS NOTES
S: +headache started today, but also woke up with cold today.  Some sinus congestion and sore throat.  Feet very swollen x 1 week.      O: Vitals see flows     Vitals:    11/28/23 1348   BP: (!) 140/92       +3 pitting edema bilaterally to calves.      A: Karlene Arriaza IUP at 35w0d     Patient Active Problem List    Diagnosis Date Noted    Advanced maternal age in multigravida 10/09/2023    Grand multipara 10/09/2023    Late prenatal care @ 25 weeks 10/09/2023    ASCUS of cervix with negative high risk HPV 10/09/2023          P: Studies/Labs to order today: to L&D today for pre-e eval.     Report to Dr Trivedi   Follow-up: 1 wks    Precautions reviewed with patient appropriate for gestational age.

## 2023-11-28 NOTE — PROGRESS NOTES
OB follow up   + fetal movement.  No VB, LOF or UC's.  Phone # [phone]  Preferred pharmacy confirmed.  C/o swelling on feet, no headaches,

## 2023-11-28 NOTE — H&P
LABOR & DELIVERY TRIAGE HISTORY AND PHYSICAL  Patient Name: Karlene Arriaza Age/Sex: 33 y.o. female  : 1990 MRN: [id number]      HISTORY OF PRESENT ILLNESS:   Karlene Arriaza is a 33 y.o.  at 35w0d weeks gestation by LMP approx 3/5/23 with late prenatal care who is was sent to the hospital after having elevated BP at her routine prenatal visit with associated headache that begun this morning and a week of lower extremity swelling. The patient also reports that she woke up feeling a little sick with her children as positive sick contacts. She denies vision changes, dysphagia, CP, SOB, n/v, and dysuria. She is endorsing a headache that she woke up with, edema that has been present for roughly a week, and some nasal congestion/sore throat since this morning.     Would like BTL if she has a c/s and reports she has signed the necessary paper work.     Patient states last episode of intercourse was over one month ago    Fetal movement: endorses  Leakage of Fluid: denies   Vaginal Bleeding: denies  Contractions: reports infrequent contractions, reporting some this morning but denying currenlty.     Her EDC is 2024, by Ultrasound but is 12/10/23 based on LMP of ~3/5/23.   She has received prenatal care with SH[facility] cassidy, though she had late prenatal care.  Complications during pregnancy: Late prenatal care, AMA, gHTN, grand multiparity    History of STD: Denies any history of STD/STI.    OBSTETRICAL HISTORY:  Patient does report a postpartum hemorrhage with her fifth child that required blood transfusion in [place].  OB History    Para Term  AB Living   6 5 5     5   SAB IAB Ectopic Molar Multiple Live Births             5      # Outcome Date GA Lbr Driss/2nd Weight Sex Delivery Anes PTL Lv   6 Current            5 Term  40w0d  2.268 kg (5 lb) F Vag-Spont   SANDHYA   4 Term  40w0d  2.722 kg (6 lb) F Vag-Spont   SANDHYA   3 Term  40w0d  2.268 kg (5 lb) F Vag-Spont   SANDHYA   2 Term  40w0d  2.268  kg (5 lb) F Vag-Spont   SANDHYA   1 Term 2010 40w0d  2.722 kg (6 lb) M Vag-Spont   SANDHYA       MEDICAL HISTORY:  Denies any past medical history outside of her pregnancies  Past Medical History:   Diagnosis Date    Gestational hypertension, third trimester 11/28/2023       SURGICAL HISTORY:  No past surgical history on file.  Patient denies any past surgical history.    MEDICATIONS:  Medications Prior to Admission   Medication Sig Dispense Refill Last Dose    ferrous sulfate 325 (65 Fe) MG tablet Take 325 mg by mouth every day.       Prenatal MV-Min-Fe Fum-FA-DHA (PRENATAL 1 PO) Take  by mouth.      Patient reports she took no medications prior to her pregnancy.    ALLERGIES:  No Known Allergies     SOCIAL HISTORY:   Tobacco use: Denies ever  Alcohol use: Denies ever  Recreational drug use: Denies ever   reports that she has never smoked. She does not have any smokeless tobacco history on file. She reports that she does not currently use alcohol. She reports that she does not use drugs.    FAMILY HISTORY:  Reports family history significant for diabetes mellitus in her father.  Denies any other family history    Clotting/bleeding disorders: Denies  Gynecological cancers: Denies  Family History   Problem Relation Age of Onset    No Known Problems Mother     Diabetes Father        REVIEW OF SYSTEMS:  Pertinent items are noted in HPI.      PHYSICAL EXAM:  Temp:  [36.4 °C (97.6 °F)] 36.4 °C (97.6 °F)  Pulse:  [] 100  Resp:  [16] 16  BP: (140-161)/(92-99) 161/99  SpO2:  [98 %] 98 %  Body mass index is 39.51 kg/m².    General:  AAOx3, NAD   CV: RRR, no M/R/G   Pulmonary:  CTAB, normal effort   Abdomen: Soft and non-tender including in the right upper quadrant, gravid uterus   FHT: Baseline of 145 bpm, moderate variability, + accelerations, - variable deceleration   Contractions: None seen on tocometry       SVE: 2/50/-2, vertex anterior by MARTHA Irizarry                         Bedside Ultrasound:   Confirmed vertex  position    Prenatal Labs:  HepBSAg NR  HIV NR  Treponemal qualitative NR  Rubella immune with a value of 11 with cutoff as 10  ABO A+, antibody negative    Group B Strep: None on file, ordered 2023, penicillin G ordered      ASSESSMENT/ PLAN:   Karlene Arriaza is a 33 y.o.  at 35w0d gestation by LMP which was approximately 3/5/2023 with late prenatal care during second trimester who was sent here from her routine prenatal appointment with elevated blood pressures, headache, and pitting edema.  The patient reports that headache started this morning, edema had started 1 week ago.  She denies vision changes, shortness of breath, chest pain, loss of fluids/vaginal bloody discharge.  At presentation she reported her headache to be a 9/10, following nifedipine for blood pressures with systolic 160s and 1 g of Tylenol the patient reports her headache is now a 4/10.  She will be admitted for treatment of preeclampsia with severe features given her systolic blood pressure of 160s and headache, and potential delivery of fetus.    #SIUP at 35w0d  #preeclampsia with severe features  #AMA  #Grand multiparity  Patient with 9/10 headache upon presentation, systolic blood pressures x2 greater than 160.  Will be admitted for treatment of preeclampsia and for fetal heart monitoring with potential delivery.  Category 1 fetal heart tracing with a baseline of 145 bpm, no contractions at this time no decelerations at this time  -Continue fetal heart monitoring  -Nifedipine ordered, hypertensive protocol  -Magnesium for neuro protection started  -Maintain UOP of 30 cc/h, LR IVF with range  -1g Tylenol for headache  -CBC, CMP, UA, PC ratio  -Patient interested in BTL if she is C-sectioned     #h/o post-partum hemorrhage  Patient reports a history of postpartum hemorrhage with her fifth child.  Given that the patient has been hypertensive at presentation we will work to avoid Methergine if needed.  -COD  -Hemorrhage cart at  delivery  -Plan for Pitocin postdelivery, Cytotec preferred over Methergine at this time    #GBS unknown  GBS unknown status at presentation.  Patient is , will treat.  -Penicillin ordered  -Direct GBS, rizzo broth GBS ordered    #Possible infection in pregnancy  Patient reports waking this morning with nasal congestion and sore throat.  She reports that her children are sick.  Patient without a fever on presentation.  Continue to monitor signs and symptoms.  -COVID flu RSV swab  -Continue to monitor vital signs    Joel Trivedi MD   PGY-1 Resident   [facility]

## 2023-11-29 LAB
BASOPHILS # BLD AUTO: 0.3 % (ref 0–1.8)
BASOPHILS # BLD: 0.04 K/UL (ref 0–0.12)
EOSINOPHIL # BLD AUTO: 0.02 K/UL (ref 0–0.51)
EOSINOPHIL NFR BLD: 0.2 % (ref 0–6.9)
ERYTHROCYTE [DISTWIDTH] IN BLOOD BY AUTOMATED COUNT: 44.2 FL (ref 35.9–50)
ERYTHROCYTE [DISTWIDTH] IN BLOOD BY AUTOMATED COUNT: 44.3 FL (ref 35.9–50)
ERYTHROCYTE [DISTWIDTH] IN BLOOD BY AUTOMATED COUNT: 44.6 FL (ref 35.9–50)
GP B STREP DNA SPEC QL NAA+PROBE: NEGATIVE
HCT VFR BLD AUTO: 22.8 % (ref 37–47)
HCT VFR BLD AUTO: 25.5 % (ref 37–47)
HCT VFR BLD AUTO: 26.4 % (ref 37–47)
HGB BLD-MCNC: 7.5 G/DL (ref 12–16)
HGB BLD-MCNC: 8.1 G/DL (ref 12–16)
HGB BLD-MCNC: 8.4 G/DL (ref 12–16)
IMM GRANULOCYTES # BLD AUTO: 0.09 K/UL (ref 0–0.11)
IMM GRANULOCYTES NFR BLD AUTO: 0.7 % (ref 0–0.9)
LYMPHOCYTES # BLD AUTO: 1.06 K/UL (ref 1–4.8)
LYMPHOCYTES NFR BLD: 8.5 % (ref 22–41)
MAGNESIUM SERPL-MCNC: 6 MG/DL (ref 1.5–2.5)
MAGNESIUM SERPL-MCNC: 6.9 MG/DL (ref 1.5–2.5)
MAGNESIUM SERPL-MCNC: 7.2 MG/DL (ref 1.5–2.5)
MCH RBC QN AUTO: 23.9 PG (ref 27–33)
MCH RBC QN AUTO: 24.2 PG (ref 27–33)
MCH RBC QN AUTO: 24.9 PG (ref 27–33)
MCHC RBC AUTO-ENTMCNC: 31.8 G/DL (ref 32.2–35.5)
MCHC RBC AUTO-ENTMCNC: 31.8 G/DL (ref 32.2–35.5)
MCHC RBC AUTO-ENTMCNC: 32.9 G/DL (ref 32.2–35.5)
MCV RBC AUTO: 75.2 FL (ref 81.4–97.8)
MCV RBC AUTO: 75.7 FL (ref 81.4–97.8)
MCV RBC AUTO: 76.1 FL (ref 81.4–97.8)
MONOCYTES # BLD AUTO: 0.82 K/UL (ref 0–0.85)
MONOCYTES NFR BLD AUTO: 6.6 % (ref 0–13.4)
NEUTROPHILS # BLD AUTO: 10.44 K/UL (ref 1.82–7.42)
NEUTROPHILS NFR BLD: 83.7 % (ref 44–72)
NRBC # BLD AUTO: 0 K/UL
NRBC BLD-RTO: 0 /100 WBC (ref 0–0.2)
PLATELET # BLD AUTO: 233 K/UL (ref 164–446)
PLATELET # BLD AUTO: 240 K/UL (ref 164–446)
PLATELET # BLD AUTO: 244 K/UL (ref 164–446)
PMV BLD AUTO: 10 FL (ref 9–12.9)
PMV BLD AUTO: 10 FL (ref 9–12.9)
PMV BLD AUTO: 9.8 FL (ref 9–12.9)
RBC # BLD AUTO: 3.01 M/UL (ref 4.2–5.4)
RBC # BLD AUTO: 3.35 M/UL (ref 4.2–5.4)
RBC # BLD AUTO: 3.51 M/UL (ref 4.2–5.4)
WBC # BLD AUTO: 10.3 K/UL (ref 4.8–10.8)
WBC # BLD AUTO: 11.9 K/UL (ref 4.8–10.8)
WBC # BLD AUTO: 12.5 K/UL (ref 4.8–10.8)

## 2023-11-29 PROCEDURE — 700102 HCHG RX REV CODE 250 W/ 637 OVERRIDE(OP): Performed by: NURSE PRACTITIONER

## 2023-11-29 PROCEDURE — 700111 HCHG RX REV CODE 636 W/ 250 OVERRIDE (IP): Mod: JZ | Performed by: FAMILY MEDICINE

## 2023-11-29 PROCEDURE — 700105 HCHG RX REV CODE 258: Performed by: OBSTETRICS & GYNECOLOGY

## 2023-11-29 PROCEDURE — 59409 OBSTETRICAL CARE: CPT

## 2023-11-29 PROCEDURE — 700102 HCHG RX REV CODE 250 W/ 637 OVERRIDE(OP): Performed by: OBSTETRICS & GYNECOLOGY

## 2023-11-29 PROCEDURE — 99999 PR NO CHARGE: CPT | Performed by: NURSE PRACTITIONER

## 2023-11-29 PROCEDURE — 770002 HCHG ROOM/CARE - OB PRIVATE (112)

## 2023-11-29 PROCEDURE — 85027 COMPLETE CBC AUTOMATED: CPT

## 2023-11-29 PROCEDURE — 700101 HCHG RX REV CODE 250: Performed by: NURSE PRACTITIONER

## 2023-11-29 PROCEDURE — A9270 NON-COVERED ITEM OR SERVICE: HCPCS | Performed by: OBSTETRICS & GYNECOLOGY

## 2023-11-29 PROCEDURE — 700111 HCHG RX REV CODE 636 W/ 250 OVERRIDE (IP): Mod: JZ | Performed by: OBSTETRICS & GYNECOLOGY

## 2023-11-29 PROCEDURE — 700105 HCHG RX REV CODE 258: Performed by: NURSE PRACTITIONER

## 2023-11-29 PROCEDURE — 304965 HCHG RECOVERY SERVICES

## 2023-11-29 PROCEDURE — A9270 NON-COVERED ITEM OR SERVICE: HCPCS | Performed by: NURSE PRACTITIONER

## 2023-11-29 PROCEDURE — 85025 COMPLETE CBC W/AUTO DIFF WBC: CPT

## 2023-11-29 PROCEDURE — 83735 ASSAY OF MAGNESIUM: CPT

## 2023-11-29 PROCEDURE — 36415 COLL VENOUS BLD VENIPUNCTURE: CPT

## 2023-11-29 PROCEDURE — 59410 OBSTETRICAL CARE: CPT | Performed by: NURSE PRACTITIONER

## 2023-11-29 RX ORDER — OXYCODONE HYDROCHLORIDE 5 MG/1
5 TABLET ORAL EVERY 4 HOURS PRN
Status: DISCONTINUED | OUTPATIENT
Start: 2023-11-29 | End: 2023-12-01 | Stop reason: HOSPADM

## 2023-11-29 RX ORDER — MAGNESIUM SULFATE HEPTAHYDRATE 40 MG/ML
4 INJECTION, SOLUTION INTRAVENOUS ONCE
Status: DISCONTINUED | OUTPATIENT
Start: 2023-11-29 | End: 2023-11-30

## 2023-11-29 RX ORDER — ACETAMINOPHEN 500 MG
1000 TABLET ORAL EVERY 6 HOURS PRN
Status: DISCONTINUED | OUTPATIENT
Start: 2023-11-29 | End: 2023-12-01 | Stop reason: HOSPADM

## 2023-11-29 RX ORDER — MISOPROSTOL 200 UG/1
600 TABLET ORAL
Status: COMPLETED | OUTPATIENT
Start: 2023-11-29 | End: 2023-11-29

## 2023-11-29 RX ORDER — IBUPROFEN 800 MG/1
800 TABLET ORAL EVERY 8 HOURS PRN
Status: DISCONTINUED | OUTPATIENT
Start: 2023-11-29 | End: 2023-12-01 | Stop reason: HOSPADM

## 2023-11-29 RX ORDER — MAGNESIUM SULFATE HEPTAHYDRATE 40 MG/ML
2 INJECTION, SOLUTION INTRAVENOUS CONTINUOUS
Status: DISCONTINUED | OUTPATIENT
Start: 2023-11-29 | End: 2023-11-30

## 2023-11-29 RX ORDER — DOCUSATE SODIUM 100 MG/1
100 CAPSULE, LIQUID FILLED ORAL 2 TIMES DAILY PRN
Status: DISCONTINUED | OUTPATIENT
Start: 2023-11-29 | End: 2023-12-01 | Stop reason: HOSPADM

## 2023-11-29 RX ORDER — ENOXAPARIN SODIUM 100 MG/ML
40 INJECTION SUBCUTANEOUS DAILY
Status: DISCONTINUED | OUTPATIENT
Start: 2023-11-29 | End: 2023-12-01 | Stop reason: HOSPADM

## 2023-11-29 RX ORDER — CALCIUM GLUCONATE 94 MG/ML
1 INJECTION, SOLUTION INTRAVENOUS
Status: DISCONTINUED | OUTPATIENT
Start: 2023-11-29 | End: 2023-11-30

## 2023-11-29 RX ORDER — SODIUM CHLORIDE, SODIUM LACTATE, POTASSIUM CHLORIDE, CALCIUM CHLORIDE 600; 310; 30; 20 MG/100ML; MG/100ML; MG/100ML; MG/100ML
INJECTION, SOLUTION INTRAVENOUS PRN
Status: DISCONTINUED | OUTPATIENT
Start: 2023-11-29 | End: 2023-12-01 | Stop reason: HOSPADM

## 2023-11-29 RX ADMIN — MISOPROSTOL 600 MCG: 200 TABLET ORAL at 03:43

## 2023-11-29 RX ADMIN — OXYTOCIN 75 ML/HR: 10 INJECTION, SOLUTION INTRAMUSCULAR; INTRAVENOUS at 02:34

## 2023-11-29 RX ADMIN — SODIUM CHLORIDE, POTASSIUM CHLORIDE, SODIUM LACTATE AND CALCIUM CHLORIDE: 600; 310; 30; 20 INJECTION, SOLUTION INTRAVENOUS at 09:47

## 2023-11-29 RX ADMIN — MAGNESIUM SULFATE HEPTAHYDRATE 2 G/HR: 40 INJECTION, SOLUTION INTRAVENOUS at 13:14

## 2023-11-29 RX ADMIN — ENOXAPARIN SODIUM 40 MG: 100 INJECTION SUBCUTANEOUS at 14:35

## 2023-11-29 RX ADMIN — SODIUM CHLORIDE 1000 MG: 900 INJECTION INTRAVENOUS at 05:04

## 2023-11-29 RX ADMIN — OXYTOCIN 20 UNITS: 10 INJECTION, SOLUTION INTRAMUSCULAR; INTRAVENOUS at 01:21

## 2023-11-29 RX ADMIN — MAGNESIUM SULFATE HEPTAHYDRATE 2 G/HR: 40 INJECTION, SOLUTION INTRAVENOUS at 09:51

## 2023-11-29 RX ADMIN — IBUPROFEN 800 MG: 800 TABLET, FILM COATED ORAL at 05:18

## 2023-11-29 RX ADMIN — SODIUM CHLORIDE, POTASSIUM CHLORIDE, SODIUM LACTATE AND CALCIUM CHLORIDE: 600; 310; 30; 20 INJECTION, SOLUTION INTRAVENOUS at 21:25

## 2023-11-29 ASSESSMENT — PAIN DESCRIPTION - PAIN TYPE
TYPE: ACUTE PAIN

## 2023-11-29 NOTE — LACTATION NOTE
This note was copied from a baby's chart.  Basics of hospital grade breast pump use introduced today with Karlene. Written information to help support frequency and duration provided. Hygiene kit to bedside with instructions for pump kit cleaning.    Plan is to follow this mother while baby remains hospitalized to ensure the establishment and subsequent maintenance of adequate milk supply, and help direct her to appropriate resources. She has breast fed her other 5 children for approximately a year each.

## 2023-11-29 NOTE — PROGRESS NOTES
Karlene Arriaza is a 33 y.o. female    VAGINAL DELIVERY NOTE:      VE on admission: /-2  Labor course: Pt was sent from the clinic today with elevated blood pressures and severe features.  Pt was then placed on magnesium sulfate and labor was induced. Pt was started on pitocin.  Membranes were artificially ruptured and patient quickly progressed to complete.     OB History    Para Term  AB Living   6 5 5     5   SAB IAB Ectopic Molar Multiple Live Births             5      # Outcome Date GA Lbr Driss/2nd Weight Sex Delivery Anes PTL Lv   6 Current            5 Term  40w0d  2.268 kg (5 lb) F Vag-Spont   SANDHYA   4 Term  40w0d  2.722 kg (6 lb) F Vag-Spont   SANDHYA   3 Term  40w0d  2.268 kg (5 lb) F Vag-Spont   SANDHYA   2 Term  40w0d  2.268 kg (5 lb) F Vag-Spont   SANDHYA   1 Term  40w0d  2.722 kg (6 lb) M Vag-Spont   SANDHYA       Weeks gestation: 35.1  Diagnosis: , now    GBS: positive    At 0119:Patient pushed with little maternal effort and rapidly delivered,  of viable boy infant over an intact perineum. Infant head delivered in OA with restitution to LOT. Nuchal cord present: no. Shoulders and rest of body delivered uneventfully. Cord was delayed for 1.5 min, then clamped and cut when pulsations ceased.   Apgars: 7/8  Birth weight:pending    Placenta: spontaneous and intact with 3 VC    Laceration: none    Anesthesia: Epidural  Excellent hemostasis  EBL: 200 ml    Sponge and needle counts correct: yes    Tolerated procedure well  Patient and infant will be transferred to postpartum unit to recover    ROBIN Romeo CNM Dr Ray is the attending MD today

## 2023-11-29 NOTE — CARE PLAN
Problem: Psychosocial - L&D  Goal: Patient's level of anxiety will decrease  Outcome: Progressing   The patient is Watcher - Medium risk of patient condition declining or worsening         Progress made toward(s) clinical / shift goals:  Pt will ba able to verbalize signs and symptoms of preeclampsia and notify RN if H/A, edema, worsens.

## 2023-11-29 NOTE — PROGRESS NOTES
Patient was admitted to the floor. Vital signs stable. Patient has LR and magnesium currently flowing with patent IV. Patient was oriented to the room. Catheter in place. Patient was educated on pull cord. Fundus firm and bleeding light. Patient will call with any needs. Will continue to monitor.

## 2023-11-29 NOTE — PROGRESS NOTES
1545 Report from Sera Murillo RN.   33 y.o.  @ 35.0 sent over from clinic with elevated bp, H/A, and edema.   Here for IOL for PIH with severe features.   IV started in LFA by Dr. Baxter on 3rd attempt.   1545 SVE 2/50/-2/vertex/anterior/soft  Pt transferred to Room S215   Magnesium Sulfate 4 gram bolus /2gram /hr  Fischer catheter and continuous pulse ox applied.    Report to NOC MARTHA Meza

## 2023-11-29 NOTE — ANESTHESIA PROCEDURE NOTES
Epidural Block    Date/Time: 11/28/2023 9:33 PM    Performed by: Scott Baxter M.D.  Authorized by: Scott Baxter M.D.    Patient Location:  OB  Start Time:  11/28/2023 9:33 PM  Reason for Block: labor analgesia    patient identified, IV checked, site marked, risks and benefits discussed, surgical consent, monitors and equipment checked and pre-op evaluation    Patient Position:  Sitting  Prep: ChloraPrep, patient draped and sterile technique    Monitoring:  Blood pressure, continuous pulse oximetry and heart rate  Approach:  Midline  Location:  L3-L4  Injection Technique:  TANYA saline  Skin infiltration:  Lidocaine  Strength:  1%  Dose:  3ml  Needle Type:  Tuohy  Needle Gauge:  17 G  Needle Length:  3.5 in  Loss of resistance::  6  Catheter Size:  19 G  Catheter at Skin Depth:  11  Test Dose Result:  Negative

## 2023-11-29 NOTE — ANESTHESIA POSTPROCEDURE EVALUATION
Patient: Karlene Arriaza    Procedure Summary       Date: 11/28/23 Room / Location:     Anesthesia Start: 2125 Anesthesia Stop: 11/29/23 0119    Procedure: Labor Epidural Diagnosis:     Scheduled Providers:  Responsible Provider: Scott Baxter M.D.    Anesthesia Type: epidural ASA Status: 2            Final Anesthesia Type: epidural  Last vitals  BP   Blood Pressure: 128/75    Temp   36.8 °C (98.3 °F)    Pulse   (!) 124   Resp   16    SpO2   96 %      Anesthesia Post Evaluation    Patient location during evaluation: floor  Patient participation: complete - patient participated  Level of consciousness: awake and alert    Airway patency: patent  Anesthetic complications: no  Cardiovascular status: hemodynamically stable  Respiratory status: acceptable  Hydration status: euvolemic    PONV: none          There were no known notable events for this encounter.     Nurse Pain Score: 7 (NPRS)

## 2023-11-29 NOTE — ANESTHESIA TIME REPORT
Anesthesia Start and Stop Event Times       Date Time Event    11/28/2023 2125 Ready for Procedure     2125 Anesthesia Start    11/29/2023 0119 Anesthesia Stop          Responsible Staff  11/28/23 to 11/29/23      Name Role Begin End    Scott Baxter M.D. Anesth 2125 0119          Overtime Reason:  no overtime (within assigned shift)    Comments:

## 2023-11-29 NOTE — PROGRESS NOTES
0400: RN reports 200 ml blood loss after fundal rub, cytotec 600 mcg placed.    0500: SNM and CNM to bs, another 200 ml blood loss noted after fundal rub.  Clots expressed by CNM by fundal rub.  725 ml ebl.  TXA ordered and started by RN

## 2023-11-29 NOTE — PROGRESS NOTES
S: Pt is resting comfortably with the epidural.  Mother at bs.      O:    Vitals:    23 2210 23 2215 23 2220 23   BP: 129/78  127/79 130/79   Pulse: (!) 119 (!) 119 (!) 114 (!) 118   Resp:       Temp:       TempSrc:       SpO2: 98% 98% 98% 97%   Weight:       Height:               FHTs:  Baseline 135, present accels, absent decels, moderate variability        [place]: Contractions q 2-4 minutes, moderate to palpation, pitocin @ 6 milliunits        SVE: 3/75/-2         AROM, clear fluid    A/P:  1.  IUP @ 35w0d            2.  Cat 1 FHTs             3.  S/p AROM             4.  Anticipate     Lina Restrepo, ROBIN Vick CNM

## 2023-11-29 NOTE — ANESTHESIA PREPROCEDURE EVALUATION
Date: 23  Procedure: Labor Epidural       , Pre-E, Covid +    Relevant Problems   Other   (positive) Indication for care in labor or delivery       Physical Exam    Airway   Mallampati: II  TM distance: >3 FB  Neck ROM: full       Cardiovascular - normal exam  Rhythm: regular  Rate: normal  (-) murmur     Dental - normal exam           Pulmonary - normal exam  Breath sounds clear to auscultation     Abdominal    Neurological - normal exam                   Anesthesia Plan    ASA 2       Plan - epidural   Neuraxial block will be labor analgesia                  Pertinent diagnostic labs and testing reviewed    Informed Consent:    Anesthetic plan and risks discussed with patient.

## 2023-11-29 NOTE — PROGRESS NOTES
Report from NOC MARTHA Meza, poc discussed, assumed pt care.   Pt in room 215 with FOB Symoneer at bedside.  0119 male apgar 7 & 8 2120 g Baby Armando in room with parents. Transition done. Pt had a postpartum hemorrhage and was given IV Pitocin, Cytotec, txa. Repeat CBC @ 0628 HGB 8.4 down from 9.6 pre delivery. 0700 FF@U-1, light lochia, pad weighed 7   Order for Repeat CBC. 0800 CBC and Mag drawn,

## 2023-11-29 NOTE — PROGRESS NOTES
1900 - Report received from April RN at , care assumed. Perez Gestation today at 35-0 Weeks       Patient reports feeling congested. Reports her other children have been sick. Reports +FM, -LOF, -VB. Reports 5/10 HA but no changes to vision. +3 edema in lower extremities. Denies dizziness or weakness. Fischer in place.   FOB Rajinder at .      Use of FM/Idanha discussed and in place, discussed POC; ongoing as needed. Pleasant affect/mood. Review of labor process/expectations, breathing/relaxation techniques - TBD as needed. Discussed pain management options -  pt is interested in an epidural later in her labor process.      Patient/FOB deny questions/concerns regarding care since arrival to [facility].     - Vee BENAVIDES at bedside for epidural placement.     - AMY KELLY at bedside for SVE and AROM. See flowsheets for details.     0119- Delivery of viable male infant via  with AMY KELLY; see delivery summary for details.     0329- AMY KELLY notified of 249mL additional blood loss with pad change. Orders for 600 mcg of rectal Cytotec.     0405- Additional 233 mL on pad. Lin KELLY notified. In another delivery at the time.     0500- AMY KELLY and ENRRIQUE Vick CNM at bedside. 725mL expelled from fundal rub. Orders for TXA.      0700- Report given to MARTHA Martel. Fundal rub complete at bedside. Care relinquished at this time.

## 2023-11-29 NOTE — PROGRESS NOTES
S: Pt denies painful Uc's.      O:    Vitals:    23 1845 23 1900 23 1915 23 1930   BP: 129/81 (!) 142/89 135/80 135/85   Pulse: (!) 112 (!) 112 (!) 114 (!) 109   Resp:  17     Temp:       TempSrc:       SpO2: 96% 98% 97% 97%   Weight:       Height:               FHTs:  Baseline 125, present accels, absent decels, moderate variability        [place]: Contractions q 5-6 minutes, mild to palpation, pitocin @ 4 milliunits        SVE: 2/50/-2 at 1600     A/P:  1.  IUP @ 35w0d            2.  Cat 1 FHTs             3.  Continue to titrate pitocin              4.  Anticipate            5.  Will recheck cervix and possible AROM after second dose of PCROBIN Allen CNM

## 2023-11-29 NOTE — CARE PLAN
The patient is Watcher - Medium risk of patient condition declining or worsening    Shift Goals  Clinical Goals: Cervical Change; Monitor BP  Patient Goals: Healthy Mom; Healthy Baby  Family Goals: Comfort and Support    Progress made toward(s) clinical / shift goals:      Problem: Knowledge Deficit - L&D  Goal: Patient and family/caregivers will demonstrate understanding of plan of care, disease process/condition, diagnostic tests and medications  Outcome: Progressing     Problem: Risk for Excess Fluid Volume  Goal: Patient will demonstrate pulse, blood pressure and neurologic signs within expected ranges and without any respiratory complications  Outcome: Progressing     Problem: Psychosocial - L&D  Goal: Patient's level of anxiety will decrease  Outcome: Progressing     Problem: Risk for Injury  Goal: Patient and fetus will be free of preventable injury/complications  Outcome: Progressing     Problem: Pain  Goal: Patient's pain will be alleviated or reduced to the patient’s comfort goal  Outcome: Progressing     Problem: Risk for Maternal Injury  Goal: Patient is free of signs of cerebral ischemia (visual disturbances, headache, changes in mentation) and displays normal levels of clotting factors and liver enzymes  Outcome: Progressing       Patient is not progressing towards the following goals:       Detail Level: Zone Render In Strict Bullet Format?: No Initiate Treatment: econazole 1 % topical cream, \\nSig: Apply QD daily to rash to groin x 2-3 weeks or until cleared. (Antifungal) Initiate Treatment: hydrocortisone 2.5 % topical cream, \\nSig: Apply a thin layer to rash on scrotum bid for up to 2 weeks, must break for 2 weeks before repeating. (Steroid, wait to fill) Initiate Treatment: tacrolimus 0.1 % topical ointment, \\nSig: Apply 1-2 times a day to rash on face, ears, okay to apply on groin until cleared. Repeat as needed for flares. (Non steroid)

## 2023-11-30 LAB
ERYTHROCYTE [DISTWIDTH] IN BLOOD BY AUTOMATED COUNT: 44.8 FL (ref 35.9–50)
ERYTHROCYTE [DISTWIDTH] IN BLOOD BY AUTOMATED COUNT: 45.8 FL (ref 35.9–50)
ERYTHROCYTE [DISTWIDTH] IN BLOOD BY AUTOMATED COUNT: 46.7 FL (ref 35.9–50)
HCT VFR BLD AUTO: 21 % (ref 37–47)
HCT VFR BLD AUTO: 22.4 % (ref 37–47)
HCT VFR BLD AUTO: 26 % (ref 37–47)
HGB BLD-MCNC: 6.6 G/DL (ref 12–16)
HGB BLD-MCNC: 7.2 G/DL (ref 12–16)
HGB BLD-MCNC: 8.2 G/DL (ref 12–16)
MCH RBC QN AUTO: 23.8 PG (ref 27–33)
MCH RBC QN AUTO: 24.7 PG (ref 27–33)
MCH RBC QN AUTO: 24.8 PG (ref 27–33)
MCHC RBC AUTO-ENTMCNC: 31.4 G/DL (ref 32.2–35.5)
MCHC RBC AUTO-ENTMCNC: 31.5 G/DL (ref 32.2–35.5)
MCHC RBC AUTO-ENTMCNC: 32.1 G/DL (ref 32.2–35.5)
MCV RBC AUTO: 75.8 FL (ref 81.4–97.8)
MCV RBC AUTO: 77 FL (ref 81.4–97.8)
MCV RBC AUTO: 78.8 FL (ref 81.4–97.8)
PLATELET # BLD AUTO: 207 K/UL (ref 164–446)
PLATELET # BLD AUTO: 213 K/UL (ref 164–446)
PLATELET # BLD AUTO: 236 K/UL (ref 164–446)
PMV BLD AUTO: 10.1 FL (ref 9–12.9)
PMV BLD AUTO: 9.8 FL (ref 9–12.9)
PMV BLD AUTO: 9.9 FL (ref 9–12.9)
RBC # BLD AUTO: 2.77 M/UL (ref 4.2–5.4)
RBC # BLD AUTO: 2.91 M/UL (ref 4.2–5.4)
RBC # BLD AUTO: 3.3 M/UL (ref 4.2–5.4)
WBC # BLD AUTO: 7.9 K/UL (ref 4.8–10.8)
WBC # BLD AUTO: 7.9 K/UL (ref 4.8–10.8)
WBC # BLD AUTO: 9.5 K/UL (ref 4.8–10.8)

## 2023-11-30 PROCEDURE — 770002 HCHG ROOM/CARE - OB PRIVATE (112)

## 2023-11-30 PROCEDURE — 36430 TRANSFUSION BLD/BLD COMPNT: CPT

## 2023-11-30 PROCEDURE — 30233N1 TRANSFUSION OF NONAUTOLOGOUS RED BLOOD CELLS INTO PERIPHERAL VEIN, PERCUTANEOUS APPROACH: ICD-10-PCS | Performed by: OBSTETRICS & GYNECOLOGY

## 2023-11-30 PROCEDURE — 36415 COLL VENOUS BLD VENIPUNCTURE: CPT

## 2023-11-30 PROCEDURE — 86923 COMPATIBILITY TEST ELECTRIC: CPT

## 2023-11-30 PROCEDURE — 85027 COMPLETE CBC AUTOMATED: CPT

## 2023-11-30 PROCEDURE — P9016 RBC LEUKOCYTES REDUCED: HCPCS

## 2023-11-30 ASSESSMENT — EDINBURGH POSTNATAL DEPRESSION SCALE (EPDS)
I HAVE LOOKED FORWARD WITH ENJOYMENT TO THINGS: AS MUCH AS I EVER DID
I HAVE BEEN ANXIOUS OR WORRIED FOR NO GOOD REASON: NO, NOT AT ALL
I HAVE BLAMED MYSELF UNNECESSARILY WHEN THINGS WENT WRONG: NO, NEVER
I HAVE BEEN SO UNHAPPY THAT I HAVE HAD DIFFICULTY SLEEPING: NOT AT ALL
I HAVE FELT SAD OR MISERABLE: NO, NOT AT ALL
I HAVE BEEN SO UNHAPPY THAT I HAVE BEEN CRYING: NO, NEVER
THINGS HAVE BEEN GETTING ON TOP OF ME: NO, I HAVE BEEN COPING AS WELL AS EVER
THE THOUGHT OF HARMING MYSELF HAS OCCURRED TO ME: NEVER
I HAVE BEEN ABLE TO LAUGH AND SEE THE FUNNY SIDE OF THINGS: AS MUCH AS I ALWAYS COULD
I HAVE FELT SCARED OR PANICKY FOR NO GOOD REASON: NO, NOT AT ALL

## 2023-11-30 ASSESSMENT — PAIN DESCRIPTION - PAIN TYPE
TYPE: ACUTE PAIN

## 2023-11-30 NOTE — PROGRESS NOTES
1900 - Bedside report received from dayshift RN. 12hr chart check complete, MAR and orders reviewed. Vitals and I/O, DTR's checked per protocol. Pt awake and alert without c/o pain at this time, call light within reach, IVF verified.    2145 - Dr. Avalos notified of pt having pink-tinged urine on assessment. Other assessment findings reviewed and WDL at this time. CBC already ordered for AM. Telephone order received to remove bangura catheter at 24hrs postpartum when magnesium sulfate discontinued.    0120 - Magnesium sulfate infusion and LR discontinued. Pt saline locked. VS performed as per flowsheet. Pt assisted to dangle, stand, and ambulate to restroom. Tolerated well without dizziness, lightheadedness, or increased pain. Bangura catheter discontinued, pt education given to measure first void and need to void within 6hrs of catheter removal. [place] care/pad change performed and pt assisted to sit up in chair. Family at bedside for support.

## 2023-11-30 NOTE — CARE PLAN
Problem: Pain - Standard  Goal: Alleviation of pain or a reduction in pain to the patient’s comfort goal  Outcome: Progressing     Problem: Altered Physiologic Condition  Goal: Patient physiologically stable as evidenced by normal lochia, palpable uterine involution and vitals within normal limits  Outcome: Progressing     Problem: Infection - Postpartum  Goal: Postpartum patient will be free of signs and symptoms of infection  Outcome: Progressing   The patient is Watcher - Medium risk of patient condition declining or worsening    Shift Goals  Clinical Goals: stable BP, pain control, lochia WDL  Patient Goals: Rest, infant care, pain control  Family Goals: Comfort and Support    Progress made toward(s) clinical / shift goals:  Pain well controlled with non-narcotic analgesia, pt is able to ambulate and care for self and infant without difficulty. Lochia remains light without clots expressed, performing mary beth care and pad changes independently. No s/s infection noted on assessment, remains afebrile. BP being monitored now Q4hrs per protocol, magnesium sulfate infused until 24hrs postpartum per MD orders, pt tolerated well.    Patient is not progressing towards the following goals: NA

## 2023-11-30 NOTE — PROGRESS NOTES
Obstetrics & Gynecology Post-Delivery Progress Note    Date of Service      33 y.o.  s/p vaginal, spontaneous  Delivery date: 2023      Subjective  Pt reports she feels well.  She was encountered ambulating around her room without issue.  She reports that she is ambulating without lightheadedness.  She is tolerating p.o. intake, voiding without issue, and passing flatus.  She reports that she has not yet had a bowel movement.  Her vaginal bleeding is minimal, she is pumping/breast-feeding, and her pain is well-controlled per mar.  She plans for an outpatient tubal ligation.    Pain: Well controlled  Bleeding: lochia minimal reported  Tolerating PO: yes  Voiding: without difficulty  Ambulating: yes without lightheadedness  Passing flatus: Yes  Feeding: breastfeeding is the plan, she is currently pumping milk as  is not yet latched well      Objective  24hr VS:  Temp:  [36.3 °C (97.3 °F)-37.5 °C (99.5 °F)] 37 °C (98.6 °F)  Pulse:  [] 86  Resp:  [16-20] 18  BP: (101-138)/(58-98) 123/87  SpO2:  [94 %-98 %] 97 %    Physical Exam  Gen: well appearing, no apparent distress, resting comfortably in bed  CV: rrr, no m/r/g  Resp: CTAB, symmetric breath sounds  Abd: soft, nontender, nondistended  Fundus: firm and below umbilicus  Incision: not applicable, (vaginal delivery)  Ext: symmetric, no peripheral edema, calves nontender    Labs:  Recent Labs     23  1555 23  0628 23  0812 23  1426 23  0553   WBC 10.1 12.5* 11.9* 10.3 7.9   RBC 4.01* 3.51* 3.35* 3.01* 2.77*   HEMOGLOBIN 9.6* 8.4* 8.1* 7.5* 6.6*   HEMATOCRIT 30.9* 26.4* 25.5* 22.8* 21.0*   MCV 77.1* 75.2* 76.1* 75.7* 75.8*   MCH 23.9* 23.9* 24.2* 24.9* 23.8*   RDW 44.1 44.3 44.2 44.6 45.8   PLATELETCT 258 244 240 233 207   MPV 10.1 10.0 10.0 9.8 10.1   NEUTSPOLYS 79.50* 83.70*  --   --   --    LYMPHOCYTES 9.80* 8.50*  --   --   --    MONOCYTES 6.90 6.60  --   --   --    EOSINOPHILS 2.50 0.20  --   --   --     BASOPHILS 0.50 0.30  --   --   --          Medications  [Held by provider] enoxaparin (LOVENOX) injection, 40 mg, Subcutaneous, DAILY AT 1800      PRN medications: LR, docusate sodium, ibuprofen, acetaminophen, tetanus-dipth-acell pertussis, measles, mumps and rubella vaccine, oxyCODONE immediate release, carboPROST      Assessment/Plan  Karlene Arriaza is a 33 y.o.yo  postpartum day #1  s/p vaginal, spontaneous    - Post care: meeting all goals, but is having anemia down to hemoglobin of 6.6 following a postpartum hemorrhage that began 3 hours postpartum.  - EBL was 1100 cc, 3 hours after delivery postpartum hemorrhage was noted. Miso/txa/pitocin.  Hemoglobin has trended from 9.5, 8.4, 7.5, 6.6, and following 1 unit transfusion is 8.2.  She will have hemograms at 1930 and then 0300 tomorrow  - Pain: controlled  - Rh+, Rubella Immune  - Method of Feeding: plans to breastfeed and currently pumping breastmilk  - Method of Contraception: tubal ligation in the outpatient setting  VTE prophylaxis: Lovenox held in the setting of postpartum hemorrhage    - Disposition: Will be determined on postpartum hemorrhage status.  Likely postpartum day 3    Joel Trivedi M.D.  PGY-1, [facility]

## 2023-11-30 NOTE — LACTATION NOTE
This note was copied from a baby's chart.  Evaluated mothers use of breast pump to include frequency, duration, suction and speed settings, as well as flange fit and comfort.Evaluated mothers use of breast pump to include frequency, duration, suction and speed settings, as well as flange fit and comfort.She is preferring to single side pump so I set her up for this. [facility] referral was faxed and mother provided with phone number to follow-up phone call today.

## 2023-11-30 NOTE — PROGRESS NOTES
0700- Received report from MARTHA Mosquera. Assumed care. 12 hour chart check, MAR and orders reviewed.      0800- Assessment complete. Fundus firm and palpable, lochia scant to light rubra. Pain management and interventions discussed with pt. Declines pain at this time. POC discussed. Patient will receive blood transfusion today and states the provider has consented her but she did not sign a consent form. All questions and concerns discussed. No further concerns.    0950- Blood transfusion consent signed at bedside. Blood transfusion started at this time.

## 2023-11-30 NOTE — CARE PLAN
The patient is Stable - Low risk of patient condition declining or worsening    Shift Goals  Clinical Goals: pain control, lochia remain WNL    Progress made toward(s) clinical / shift goals:  Patient declines pain at this time. Ambulating, voiding, and tolerating PO well at this time. Bonding with infant, on 3 step feeding plan. No s/sx of distress noted.    Patient is not progressing towards the following goals:

## 2023-12-01 ENCOUNTER — PHARMACY VISIT (OUTPATIENT)
Dept: PHARMACY | Facility: MEDICAL CENTER | Age: 33
End: 2023-12-01
Payer: COMMERCIAL

## 2023-12-01 VITALS
OXYGEN SATURATION: 94 % | HEART RATE: 94 BPM | TEMPERATURE: 97.9 F | HEIGHT: 62 IN | DIASTOLIC BLOOD PRESSURE: 89 MMHG | WEIGHT: 216 LBS | SYSTOLIC BLOOD PRESSURE: 135 MMHG | BODY MASS INDEX: 39.75 KG/M2 | RESPIRATION RATE: 18 BRPM

## 2023-12-01 LAB
ERYTHROCYTE [DISTWIDTH] IN BLOOD BY AUTOMATED COUNT: 45.4 FL (ref 35.9–50)
HCT VFR BLD AUTO: 22.4 % (ref 37–47)
HGB BLD-MCNC: 7.1 G/DL (ref 12–16)
MCH RBC QN AUTO: 24.6 PG (ref 27–33)
MCHC RBC AUTO-ENTMCNC: 31.7 G/DL (ref 32.2–35.5)
MCV RBC AUTO: 77.5 FL (ref 81.4–97.8)
PLATELET # BLD AUTO: 209 K/UL (ref 164–446)
PMV BLD AUTO: 9.8 FL (ref 9–12.9)
RBC # BLD AUTO: 2.89 M/UL (ref 4.2–5.4)
WBC # BLD AUTO: 7.5 K/UL (ref 4.8–10.8)

## 2023-12-01 PROCEDURE — RXMED WILLOW AMBULATORY MEDICATION CHARGE: Performed by: OBSTETRICS & GYNECOLOGY

## 2023-12-01 PROCEDURE — A9270 NON-COVERED ITEM OR SERVICE: HCPCS | Performed by: NURSE PRACTITIONER

## 2023-12-01 PROCEDURE — A9270 NON-COVERED ITEM OR SERVICE: HCPCS | Performed by: OBSTETRICS & GYNECOLOGY

## 2023-12-01 PROCEDURE — 700102 HCHG RX REV CODE 250 W/ 637 OVERRIDE(OP): Performed by: OBSTETRICS & GYNECOLOGY

## 2023-12-01 PROCEDURE — 85027 COMPLETE CBC AUTOMATED: CPT

## 2023-12-01 PROCEDURE — 700102 HCHG RX REV CODE 250 W/ 637 OVERRIDE(OP): Performed by: NURSE PRACTITIONER

## 2023-12-01 PROCEDURE — 36415 COLL VENOUS BLD VENIPUNCTURE: CPT

## 2023-12-01 RX ORDER — LABETALOL 100 MG/1
100 TABLET, FILM COATED ORAL 2 TIMES DAILY
Qty: 60 TABLET | Refills: 12 | Status: SHIPPED | OUTPATIENT
Start: 2023-12-01

## 2023-12-01 RX ORDER — LABETALOL 100 MG/1
100 TABLET, FILM COATED ORAL TWICE DAILY
Status: DISCONTINUED | OUTPATIENT
Start: 2023-12-01 | End: 2023-12-01 | Stop reason: HOSPADM

## 2023-12-01 RX ORDER — MEDROXYPROGESTERONE ACETATE 150 MG/ML
150 INJECTION, SUSPENSION INTRAMUSCULAR ONCE
Status: SHIPPED | OUTPATIENT
Start: 2023-12-01 | End: 2023-12-04

## 2023-12-01 RX ADMIN — LABETALOL HYDROCHLORIDE 100 MG: 100 TABLET, FILM COATED ORAL at 06:40

## 2023-12-01 RX ADMIN — LABETALOL HYDROCHLORIDE 100 MG: 100 TABLET, FILM COATED ORAL at 17:45

## 2023-12-01 RX ADMIN — IBUPROFEN 800 MG: 800 TABLET, FILM COATED ORAL at 15:37

## 2023-12-01 ASSESSMENT — PAIN DESCRIPTION - PAIN TYPE
TYPE: ACUTE PAIN

## 2023-12-01 NOTE — CARE PLAN
Problem: Pain - Standard  Goal: Alleviation of pain or a reduction in pain to the patient’s comfort goal  Outcome: Progressing     Problem: Knowledge Deficit - Standard  Goal: Patient and family/care givers will demonstrate understanding of plan of care, disease process/condition, diagnostic tests and medications  Outcome: Progressing     Problem: Knowledge Deficit - Postpartum  Goal: Patient will verbalize and demonstrate understanding of self and infant care  Outcome: Progressing     Problem: Psychosocial - Postpartum  Goal: Patient will verbalize and demonstrate effective bonding and parenting behavior  Outcome: Progressing     Problem: Altered Physiologic Condition  Goal: Patient physiologically stable as evidenced by normal lochia, palpable uterine involution and vitals within normal limits  Outcome: Progressing   The patient is Stable - Low risk of patient condition declining or worsening    Shift Goals  Clinical Goals: vitals signs stable, fundus firm, ambulating and voiding without difficulty  Patient Goals: rest, infant cares  Family Goals: support    Progress made toward(s) clinical / shift goals:  Karlene is post partum with her sixth baby, her vitals are stable with slightly elevated blood pressures, her fundus is firm, she is ambulating and voiding without difficulty, she is learning to do infant cares and getting rest as she can.    Patient is not progressing towards the following goals:

## 2023-12-01 NOTE — PROGRESS NOTES
Shift Goals  Clinical Goals: vitals signs stable, fundus firm, ambulating and voiding without difficulty  Patient Goals: rest, infant cares  Family Goals: support    Progress made toward(s) clinical / shift goals:  Karlene is post partum with her sixth baby, her vitals are stable with slightly elevated blood pressures, her fundus is firm, she is ambulating and voiding without difficulty, she is learning to do infant cares and getting rest as she can.

## 2023-12-01 NOTE — DISCHARGE SUMMARY
Discharge Summary    CHIEF COMPLAINT ON ADMISSION  No chief complaint on file.      Reason for Admission  pregnancy     Admission Date  11/28/2023    CODE STATUS  Full Code    HPI & HOSPITAL COURSE  This is a 33 y.o. female here with PreEclampsia with severe features, Covid, Vaginal delivery, PP hemmorhage   No notes on file    Therefore, she is discharged in good and stable condition to home with close outpatient follow-up.    The patient recovered much more quickly than anticipated on admission.    Discharge Date  12/1/2023    FOLLOW UP ITEMS POST DISCHARGE  PP visit 5 weeks    DISCHARGE DIAGNOSES  Principal Problem:    Indication for care in labor or delivery (POA: Yes)  Resolved Problems:    * No resolved hospital problems. *      FOLLOW UP  Future Appointments   Date Time Provider Department Center   12/5/2023 10:45 AM [facility] US 1 [facility] None     No follow-up provider specified.    MEDICATIONS ON DISCHARGE     Medication List        ASK your doctor about these medications        Instructions   ferrous sulfate 325 (65 Fe) MG tablet   Take 325 mg by mouth every day.  Dose: 325 mg     PRENATAL 1 PO   Take  by mouth.              Allergies  No Known Allergies    DIET  Orders Placed This Encounter   Procedures    Diet Order Diet: New Mom     Standing Status:   Standing     Number of Occurrences:   1     Order Specific Question:   Diet:     Answer:   New Mom [20]       ACTIVITY  As tolerated.  Weight bearing as tolerated    CONSULTATIONS  none    PROCEDURES  Vag Del    LABORATORY  Lab Results   Component Value Date    SODIUM 136 11/28/2023    POTASSIUM 3.8 11/28/2023    CHLORIDE 106 11/28/2023    CO2 18 (L) 11/28/2023    GLUCOSE 62 (L) 11/28/2023    BUN 11 11/28/2023    CREATININE 0.65 11/28/2023        Lab Results   Component Value Date    WBC 7.5 12/01/2023    HEMOGLOBIN 7.1 (L) 12/01/2023    HEMATOCRIT 22.4 (L) 12/01/2023    PLATELETCT 209 12/01/2023        Total time of the discharge process exceeds 20 minutes.

## 2023-12-01 NOTE — LACTATION NOTE
This note was copied from a baby's chart.  Follow-up:    Visited couplet to see if MOB would like assistance with 1200 feeding. MOB reports following skin-to-skin baby latched and fed well for over 5 minutes shortly after I departed room following 0900 attempt. Encouraged skin-to-skin frequently to help with breastfeeding.    MOB had just pumped over 20 mL from one breast. Discussed double pumping for increased efficiency/stimulus, but MOB prefers single pumping at this time. She did not desire additional latch assistance and denied additional questions/concerns.

## 2023-12-01 NOTE — LACTATION NOTE
This note was copied from a baby's chart.  Follow-up:    MOB is 34 y/o  s/p  at 35+1 on  at 0119 following IOL for elevated BPs. History significant for AMA, late PNC, pre-eclampsia with severe features, and COVID-19 positive on admission.  prior babies (all term) for 1 year.     Baby boy, Miguel Angel, is nippling 15-20mL EBM at bottle well with paced feeding, per MOB report. Good color and tone. Latching intermittently, but not with organized suckling at breast yet. MOB pumping with double electric hospital grade pump, 15-20 mL q3h, reports sensation of breast filling.     Provided and reviewed supplementation guidelines and 3-step plan. MOB verbalized understanding of all information given.     Reviewed pump settings, wrote on white board. 80 cpm x 2mins, 60 cpm for remainder of 15 mins, 30% suction or to comfort. Provided pump rental handout for hospital grade pump. Provided pump cleaning and milk storage handouts.     Reviewed and reinforced hand-expression. MOB able to express droplets of colostrum.     Attempted latch in football hold on right breast. Baby a bit disorganized at breast, then sleepy. Encouraged as much skin to skin as possible. Encouraged attempt to latch no more than 20 mins, followed by supplementation.     Recommended outpt lactation f/u. Consult placed. Will email outpt lactation as well.     Breastfeeding plan:     Feed on three step plan as follows: offer breast every 3 hours or more often on cue, pump every 3 hours, and supplement according to guidelines given after breastfeeding.      Expect baby transitioning stools to bright yellow/green seedy loose stools by day 5.      Follow up with PEDS PCP as scheduled or with any concerns.     Call for breastfeeding for 1:1 lactation consultation through [facility] as needed and attend the breastfeeding support circles without need for appointment.

## 2023-12-01 NOTE — DISCHARGE PLANNING
Meds-to-Beds: Discharge prescription orders listed below delivered to patient's bedside. RN notified. Patient counseled. Verbalized understanding of medication.     Current Outpatient Medications   Medication Sig Dispense Refill    labetalol (NORMODYNE) 100 MG Tab Take 1 Tablet by mouth 2 times a day. 60 Tablet 12      Alexandria Champion, PharmD

## 2023-12-01 NOTE — PROGRESS NOTES
Report received from MARTHA Broderick. Assessment per flowsheet completed. Fundus firm and midline. Pt denied pain or H/A. Pt was encouraged to call with needs or concerns. Reviewed POC with pt; questions answered.

## 2023-12-01 NOTE — PROGRESS NOTES
Notified resident Shikha of patient post transfusion CBC, 1400 BP and 1500 re-check BP. Patient with no s/sx of high BP, resting in bed, ambulating in room. No new orders at this time.     1800- Notified resident of patient BP at 1748. No new orders at this time.

## 2023-12-01 NOTE — DISCHARGE INSTRUCTIONS

## 2023-12-01 NOTE — PROGRESS NOTES
"Post Partum Vaginal Delivery Note    Subjective: 33-year-old  6 para 5-1-0-6 status post vaginal delivery at 35 weeks and 1 day estimated gestational age   Patient is doing well without significant complaints.  Normal lochia.      Vitals: BP (!) 135/94 Comment: RN Notified  Pulse 95   Temp 36.7 °C (98 °F) (Temporal)   Resp 20   Ht 1.575 m (5' 2\")   Wt 98 kg (216 lb)   LMP 2023 (Approximate)   SpO2 96%   Breastfeeding Yes   BMI 39.51 kg/m²   Temp (24hrs), Av.5 °C (97.7 °F), Min:36.1 °C (96.9 °F), Max:37 °C (98.6 °F)      Exam:      GEN: Patient without distress.    Abdomen: soft, non-tender; fundus firm at/below umbilicus    Extremitie: 1+ edema; calves non-tender    Delivery Blood Loss: 1100 ml    Labs:  Recent Labs     23  1555 23  0628 23  0812 23  1531 23  2110 23  0521   WBC 10.1 12.5*   < > 9.5 7.9 7.5   RBC 4.01* 3.51*   < > 3.30* 2.91* 2.89*   HEMOGLOBIN 9.6* 8.4*   < > 8.2* 7.2* 7.1*   HEMATOCRIT 30.9* 26.4*   < > 26.0* 22.4* 22.4*   MCV 77.1* 75.2*   < > 78.8* 77.0* 77.5*   MCH 23.9* 23.9*   < > 24.8* 24.7* 24.6*   RDW 44.1 44.3   < > 46.7 44.8 45.4   PLATELETCT 258 244   < > 236 213 209   MPV 10.1 10.0   < > 9.8 9.9 9.8   NEUTSPOLYS 79.50* 83.70*  --   --   --   --    LYMPHOCYTES 9.80* 8.50*  --   --   --   --    MONOCYTES 6.90 6.60  --   --   --   --    EOSINOPHILS 2.50 0.20  --   --   --   --    BASOPHILS 0.50 0.30  --   --   --   --     < > = values in this interval not displayed.        Impression:   33 y.o.      Postpartum vaginal delivery    - routine postpartum care and maternal education    - Discharge patient later today if stable, instructions given.      2.  Postpartum hemorrhage patient's status post 1 unit packed red blood cells H/H now 7.1/22.4 after 1 unit PRBC, stable from yesterday    3.  Hypertension labetalol 100 mg twice daily started this morning    4. BCN- Plan DepoProvera before DC home    5. Home late " today        Signed By:   Helen Kamara M.D.

## 2023-12-02 NOTE — PROGRESS NOTES
1755-Resident BASILIA Trivedi was notified regarding pt's BP that was taken at 1743 (130/95) before administering labetalol 100 mg and if pt is cleared to discharge. Per MD, recheck BP in 1 hr after administering BP med. If BP is stable, pt may discharge home.    1857-Dr. Woo was notified was notified regarding pt's recheck BP at 1845 (135/89). Per MD, pt is cleared to discharge home. Discharge education given at 1200 and signed by pt.    1905-Report given to MARTHA Delatorre

## 2023-12-05 ENCOUNTER — APPOINTMENT (OUTPATIENT)
Dept: MATERNAL FETAL MEDICINE | Facility: MEDICAL CENTER | Age: 33
End: 2023-12-05
Payer: MEDICAID

## 2024-01-10 ENCOUNTER — POST PARTUM (OUTPATIENT)
Dept: OBGYN | Facility: CLINIC | Age: 34
End: 2024-01-10
Payer: MEDICAID

## 2024-01-10 VITALS — BODY MASS INDEX: 31.83 KG/M2 | DIASTOLIC BLOOD PRESSURE: 78 MMHG | SYSTOLIC BLOOD PRESSURE: 102 MMHG | WEIGHT: 174 LBS

## 2024-01-10 DIAGNOSIS — D50.9 IRON DEFICIENCY ANEMIA, UNSPECIFIED IRON DEFICIENCY ANEMIA TYPE: ICD-10-CM

## 2024-01-10 PROBLEM — Z64.1 GRAND MULTIPARA: Status: RESOLVED | Noted: 2023-10-09 | Resolved: 2024-01-10

## 2024-01-10 PROBLEM — O09.529 ADVANCED MATERNAL AGE IN MULTIGRAVIDA: Status: RESOLVED | Noted: 2023-10-09 | Resolved: 2024-01-10

## 2024-01-10 PROBLEM — O09.30 LATE PRENATAL CARE: Status: RESOLVED | Noted: 2023-10-09 | Resolved: 2024-01-10

## 2024-01-10 PROCEDURE — 3078F DIAST BP <80 MM HG: CPT | Performed by: ADVANCED PRACTICE MIDWIFE

## 2024-01-10 PROCEDURE — 0503F POSTPARTUM CARE VISIT: CPT | Performed by: ADVANCED PRACTICE MIDWIFE

## 2024-01-10 PROCEDURE — 3074F SYST BP LT 130 MM HG: CPT | Performed by: ADVANCED PRACTICE MIDWIFE

## 2024-01-10 ASSESSMENT — EDINBURGH POSTNATAL DEPRESSION SCALE (EPDS)
I HAVE BEEN ANXIOUS OR WORRIED FOR NO GOOD REASON: NO, NOT AT ALL
THE THOUGHT OF HARMING MYSELF HAS OCCURRED TO ME: NEVER
TOTAL SCORE: 0
I HAVE BEEN SO UNHAPPY THAT I HAVE HAD DIFFICULTY SLEEPING: NOT AT ALL
I HAVE BLAMED MYSELF UNNECESSARILY WHEN THINGS WENT WRONG: NO, NEVER
THINGS HAVE BEEN GETTING ON TOP OF ME: NO, I HAVE BEEN COPING AS WELL AS EVER
I HAVE FELT SAD OR MISERABLE: NO, NOT AT ALL
I HAVE BEEN SO UNHAPPY THAT I HAVE BEEN CRYING: NO, NEVER
I HAVE LOOKED FORWARD WITH ENJOYMENT TO THINGS: AS MUCH AS I EVER DID
I HAVE BEEN ABLE TO LAUGH AND SEE THE FUNNY SIDE OF THINGS: AS MUCH AS I ALWAYS COULD
I HAVE FELT SCARED OR PANICKY FOR NO GOOD REASON: NO, NOT AT ALL

## 2024-01-10 ASSESSMENT — FIBROSIS 4 INDEX: FIB4 SCORE: 1.08

## 2024-01-10 NOTE — PROGRESS NOTES
Subjective   Subjective:    Karlene Arriaza is a 33 y.o. female who presents for her postpartum exam 6 weeks following . Her prenatal course was complicated by gestational hypertension.  Her delivery was complicated by PPH. Her method of feeding infant is breast and formula feeding. She desires an BTL for her birth control method. Reports no sex prior to this appointment. Patient denies crying spells, irritability, or mood swings.     Pap in Sept abnormal with ASCUS, negative HPV.     Eating a regular diet without difficulty.   Bowel movement are Normal.      Breast problems no  Dysuria no  Vaginal bleeding yes consistent with a period  Vaginal itching no      Problem List     Patient Active Problem List    Diagnosis Date Noted    Gestational hypertension, third trimester 2023    Indication for care in labor or delivery 2023    Advanced maternal age in multigravida 10/09/2023    Grand multipara 10/09/2023    Late prenatal care @ 25 weeks 10/09/2023    ASCUS of cervix with negative high risk HPV 10/09/2023       Objective    EDPS 0      Lab:  Recent Results (from the past 1008 hour(s))   CBC WITHOUT DIFFERENTIAL    Collection Time: 23  2:26 PM   Result Value Ref Range    WBC 10.3 4.8 - 10.8 K/uL    RBC 3.01 (L) 4.20 - 5.40 M/uL    Hemoglobin 7.5 (L) 12.0 - 16.0 g/dL    Hematocrit 22.8 (L) 37.0 - 47.0 %    MCV 75.7 (L) 81.4 - 97.8 fL    MCH 24.9 (L) 27.0 - 33.0 pg    MCHC 32.9 32.2 - 35.5 g/dL    RDW 44.6 35.9 - 50.0 fL    Platelet Count 233 164 - 446 K/uL    MPV 9.8 9.0 - 12.9 fL   Serum magnesium levels every 6 hours until desired range( 5-8mg/dl) is achieved while infusion is running    Collection Time: 23  2:26 PM   Result Value Ref Range    Magnesium 7.2 (H) 1.5 - 2.5 mg/dL   CBC WITHOUT DIFFERENTIAL    Collection Time: 23  5:53 AM   Result Value Ref Range    WBC 7.9 4.8 - 10.8 K/uL    RBC 2.77 (L) 4.20 - 5.40 M/uL    Hemoglobin 6.6 (L) 12.0 - 16.0 g/dL    Hematocrit 21.0 (L) 37.0  - 47.0 %    MCV 75.8 (L) 81.4 - 97.8 fL    MCH 23.8 (L) 27.0 - 33.0 pg    MCHC 31.4 (L) 32.2 - 35.5 g/dL    RDW 45.8 35.9 - 50.0 fL    Platelet Count 207 164 - 446 K/uL    MPV 10.1 9.0 - 12.9 fL   CBC WITHOUT DIFFERENTIAL    Collection Time: 11/30/23  3:31 PM   Result Value Ref Range    WBC 9.5 4.8 - 10.8 K/uL    RBC 3.30 (L) 4.20 - 5.40 M/uL    Hemoglobin 8.2 (L) 12.0 - 16.0 g/dL    Hematocrit 26.0 (L) 37.0 - 47.0 %    MCV 78.8 (L) 81.4 - 97.8 fL    MCH 24.8 (L) 27.0 - 33.0 pg    MCHC 31.5 (L) 32.2 - 35.5 g/dL    RDW 46.7 35.9 - 50.0 fL    Platelet Count 236 164 - 446 K/uL    MPV 9.8 9.0 - 12.9 fL   CBC WITHOUT DIFFERENTIAL    Collection Time: 11/30/23  9:10 PM   Result Value Ref Range    WBC 7.9 4.8 - 10.8 K/uL    RBC 2.91 (L) 4.20 - 5.40 M/uL    Hemoglobin 7.2 (L) 12.0 - 16.0 g/dL    Hematocrit 22.4 (L) 37.0 - 47.0 %    MCV 77.0 (L) 81.4 - 97.8 fL    MCH 24.7 (L) 27.0 - 33.0 pg    MCHC 32.1 (L) 32.2 - 35.5 g/dL    RDW 44.8 35.9 - 50.0 fL    Platelet Count 213 164 - 446 K/uL    MPV 9.9 9.0 - 12.9 fL   CBC WITHOUT DIFFERENTIAL    Collection Time: 12/01/23  5:21 AM   Result Value Ref Range    WBC 7.5 4.8 - 10.8 K/uL    RBC 2.89 (L) 4.20 - 5.40 M/uL    Hemoglobin 7.1 (L) 12.0 - 16.0 g/dL    Hematocrit 22.4 (L) 37.0 - 47.0 %    MCV 77.5 (L) 81.4 - 97.8 fL    MCH 24.6 (L) 27.0 - 33.0 pg    MCHC 31.7 (L) 32.2 - 35.5 g/dL    RDW 45.4 35.9 - 50.0 fL    Platelet Count 209 164 - 446 K/uL    MPV 9.8 9.0 - 12.9 fL     Vitals:    01/10/24 1024   BP: 102/78   Weight: 174 lb     Vitals:    01/10/24 1024   BP: 102/78     Objective    Well nourished female in no acute distress  A& O x 3  Respirations clear and non labored on room air  No edema noted and pulses WNL bilaterally in lower extremities; no claudication      Assessment   Assessment:    1. Postpartum Exam  2. General Counseling and Advice on Contraception  3. Screening for Postpartum Depression  4. Gestational Hypertension    Plan   Plan:    1. CBC ordered related to  significant anemia at discharge from hospital.   2. Stop labetolol now.   3. Contraceptive counseling- Patient strongly desires BTL at this time. Patient to schedule BTL consult in upcoming weeks  4. Discussed diet, exercise and resumption of sexual activity.  Discussed signs and symptoms of stress incontinence and reviewed pelvic floor exercises.    5. Follow up 1-3 weeks

## 2024-01-10 NOTE — LETTER
January 10, 2024    To Whom It May Concern:         This is confirmation that Karlene Arriaza attended her scheduled appointment with JODEE Peraza on 1/10/24. She is able to return to work without restrictions on 1/11/2024.         If you have any questions please do not hesitate to call me at the phone number listed below.    Sincerely,          JODEE Peraza  [phone]

## 2024-02-20 ENCOUNTER — APPOINTMENT (OUTPATIENT)
Dept: OBGYN | Facility: CLINIC | Age: 34
End: 2024-02-20
Payer: MEDICAID

## 2024-02-21 ENCOUNTER — APPOINTMENT (OUTPATIENT)
Dept: OBGYN | Facility: CLINIC | Age: 34
End: 2024-02-21
Payer: MEDICAID

## 2024-02-21 ENCOUNTER — GYNECOLOGY VISIT (OUTPATIENT)
Dept: OBGYN | Facility: CLINIC | Age: 34
End: 2024-02-21
Payer: MEDICAID

## 2024-02-21 VITALS — WEIGHT: 176 LBS | BODY MASS INDEX: 32.19 KG/M2 | SYSTOLIC BLOOD PRESSURE: 102 MMHG | DIASTOLIC BLOOD PRESSURE: 64 MMHG

## 2024-02-21 DIAGNOSIS — Z30.09 ENCOUNTER FOR CONSULTATION FOR FEMALE STERILIZATION: Primary | ICD-10-CM

## 2024-02-21 PROBLEM — O13.3 GESTATIONAL HYPERTENSION, THIRD TRIMESTER: Status: RESOLVED | Noted: 2023-11-28 | Resolved: 2024-02-21

## 2024-02-21 PROCEDURE — 3078F DIAST BP <80 MM HG: CPT | Performed by: OBSTETRICS & GYNECOLOGY

## 2024-02-21 PROCEDURE — 3074F SYST BP LT 130 MM HG: CPT | Performed by: OBSTETRICS & GYNECOLOGY

## 2024-02-21 PROCEDURE — 99213 OFFICE O/P EST LOW 20 MIN: CPT | Performed by: OBSTETRICS & GYNECOLOGY

## 2024-02-21 ASSESSMENT — FIBROSIS 4 INDEX: FIB4 SCORE: 1.08

## 2024-02-21 NOTE — PROGRESS NOTES
GYNECOLOGY TUBAL CONSULT    CC:  Discuss tubal     HPI: Patient is a 33 y.o.  who presents to discuss permanent sterilization. She is not currently using anything for contraception other than condoms.  Has used OCP and depo in the past and didn't like either. She's had 6 children, vaginally, and is certain she doesn't want any more.  She has no concerns with menses.      GYNECOLOGIC HISTORY:  Current contraceptive: condoms  Last Pap: 10/2023 - ASCUS HPV neg      OBSTETRIC HISTORY:  OB History    Para Term  AB Living   6 6 5 1   6   SAB IAB Ectopic Molar Multiple Live Births           0 6      # Outcome Date GA Lbr Driss/2nd Weight Sex Delivery Anes PTL Lv   6  23 35w1d / 00:05 4 lb 10.8 oz M Vag-Spont EPI Y SANDHYA   5 Term  40w0d  5 lb F Vag-Spont   SANDHYA   4 Term  40w0d  6 lb F Vag-Spont   SANDHYA   3 Term  40w0d  5 lb F Vag-Spont   SANDHYA   2 Term  40w0d  5 lb F Vag-Spont   SANDHYA   1 Term  40w0d  6 lb M Vag-Spont   SANDHYA       MEDICAL HISTORY:  Past Medical History:   Diagnosis Date    Gestational hypertension, third trimester 2023       MEDICATIONS:  Current Outpatient Medications on File Prior to Visit   Medication Sig Dispense Refill    labetalol (NORMODYNE) 100 MG Tab Take 1 Tablet by mouth 2 times a day. 60 Tablet 12    ferrous sulfate 325 (65 Fe) MG tablet Take 325 mg by mouth every day. (Patient not taking: Reported on 1/10/2024)      Prenatal MV-Min-Fe Fum-FA-DHA (PRENATAL 1 PO) Take  by mouth. (Patient not taking: Reported on 1/10/2024)       No current facility-administered medications on file prior to visit.       ALLERGIES / REACTIONS:  No Known Allergies    FAMILY HISTORY:  Family History   Problem Relation Age of Onset    No Known Problems Mother     Diabetes Father        SURGICAL HISTORY:  No past surgical history on file.    SOCIAL HISTORY:   reports that she has never smoked. She does not have any smokeless tobacco history on file. She reports that she  does not currently use alcohol. She reports that she does not use drugs.  Social History     Socioeconomic History    Marital status:      Spouse name: Not on file    Number of children: Not on file    Years of education: Not on file    Highest education level: Not on file   Occupational History    Not on file   Tobacco Use    Smoking status: Never    Smokeless tobacco: Not on file   Vaping Use    Vaping Use: Never used   Substance and Sexual Activity    Alcohol use: Not Currently    Drug use: Never    Sexual activity: Yes   Other Topics Concern    Not on file   Social History Narrative    Not on file     Social Determinants of Health     Financial Resource Strain: Not on file   Food Insecurity: Not on file   Transportation Needs: Not on file   Physical Activity: Not on file   Stress: Not on file   Social Connections: Not on file   Intimate Partner Violence: Not on file   Housing Stability: Not on file         ROS:  General: Fever: no  HEENT: Sore Throat: no  CV: Chest Pain: no  Repiratory: Shortness of Breath: no  GI: Abdominal pain: no  : Dysuria: no    PHYSICAL EXAMINATION:  Vital Signs:   Vitals:    24 1523   BP: 102/64   BP Location: Left arm   Patient Position: Sitting   BP Cuff Size: Adult   Weight: 176 lb     Appearance/Psychiatric: She does not appear anxious.  Constitutional: The patient is well nourished.  Neck: Neck appears symmetric.  Heart: regular rate and rhythm  Lungs:clear to auscultation, Respirations unlabored, and no use of accessory muscles of inspiration noted  Abd: Soft, flat and non-tender and No masses or organomegaly  Extremeties: Legs are symmetric and without tenderness.  Skin: No rash observed.      ASSESSMENT AND PLAN:  33 y.o.      1. Encounter for consultation for female sterilization            Surgery indications: desires permanent sterilization   - reviewed process of complete removal of tubes   - reviewed permanent/irreversible procedure   - reviewed  decreased risk of ovarian cancer   - reviewed no impact in ovary function and/or menses with tubal but subjective changes are noted by patients once they are off other forms of contraception   - reviewed consideration for future contraceptive/hormonal use for non-contraceptive purposes such as menstrual control   - consent form signed.  Pt will be scheduled 30 days    Surgeries planned: Laparoscopic bilateral salpingectomy   - reviewed surgical procedure, anesthesia, postop pain and recovery course        Pepper Parham D.O.

## 2025-04-08 ENCOUNTER — INITIAL PRENATAL (OUTPATIENT)
Dept: OBGYN | Facility: CLINIC | Age: 35
End: 2025-04-08
Payer: MEDICAID

## 2025-04-08 ENCOUNTER — APPOINTMENT (OUTPATIENT)
Dept: OBGYN | Facility: CLINIC | Age: 35
End: 2025-04-08
Payer: MEDICAID

## 2025-04-08 ENCOUNTER — HOSPITAL ENCOUNTER (OUTPATIENT)
Facility: MEDICAL CENTER | Age: 35
End: 2025-04-08
Payer: MEDICAID

## 2025-04-08 VITALS — BODY MASS INDEX: 34.28 KG/M2 | DIASTOLIC BLOOD PRESSURE: 66 MMHG | WEIGHT: 187.4 LBS | SYSTOLIC BLOOD PRESSURE: 96 MMHG

## 2025-04-08 DIAGNOSIS — O09.299 HX OF PREECLAMPSIA, PRIOR PREGNANCY, CURRENTLY PREGNANT: ICD-10-CM

## 2025-04-08 DIAGNOSIS — Z64.1 GRAND MULTIPARITY: ICD-10-CM

## 2025-04-08 DIAGNOSIS — O09.93 HIGH-RISK PREGNANCY IN THIRD TRIMESTER: ICD-10-CM

## 2025-04-08 DIAGNOSIS — Z30.2 REQUEST FOR STERILIZATION: ICD-10-CM

## 2025-04-08 DIAGNOSIS — O09.521 AMA (ADVANCED MATERNAL AGE) MULTIGRAVIDA 35+, FIRST TRIMESTER: ICD-10-CM

## 2025-04-08 DIAGNOSIS — O09.522 SUPERVISION OF ELDERLY MULTIGRAVIDA, SECOND TRIMESTER: ICD-10-CM

## 2025-04-08 LAB
ABO GROUP BLD: NORMAL
BLD GP AB SCN SERPL QL: NORMAL
ERYTHROCYTE [DISTWIDTH] IN BLOOD BY AUTOMATED COUNT: 47.3 FL (ref 35.9–50)
GLUCOSE 1H P 50 G GLC PO SERPL-MCNC: 89 MG/DL (ref 70–139)
HBV SURFACE AG SER QL: ABNORMAL
HCT VFR BLD AUTO: 27.6 % (ref 37–47)
HCV AB SER QL: ABNORMAL
HGB BLD-MCNC: 8 G/DL (ref 12–16)
HIV 1+2 AB+HIV1 P24 AG SERPL QL IA: NORMAL
MCH RBC QN AUTO: 21.2 PG (ref 27–33)
MCHC RBC AUTO-ENTMCNC: 29 G/DL (ref 32.2–35.5)
MCV RBC AUTO: 73 FL (ref 81.4–97.8)
PLATELET # BLD AUTO: 295 K/UL (ref 164–446)
PMV BLD AUTO: 10 FL (ref 9–12.9)
RBC # BLD AUTO: 3.78 M/UL (ref 4.2–5.4)
RH BLD: NORMAL
RUBV AB SER QL: 7.3 IU/ML
T PALLIDUM AB SER QL IA: ABNORMAL
WBC # BLD AUTO: 9.4 K/UL (ref 4.8–10.8)

## 2025-04-08 PROCEDURE — 86850 RBC ANTIBODY SCREEN: CPT

## 2025-04-08 PROCEDURE — 87086 URINE CULTURE/COLONY COUNT: CPT

## 2025-04-08 PROCEDURE — 90471 IMMUNIZATION ADMIN: CPT

## 2025-04-08 PROCEDURE — 36415 COLL VENOUS BLD VENIPUNCTURE: CPT

## 2025-04-08 PROCEDURE — 87389 HIV-1 AG W/HIV-1&-2 AB AG IA: CPT

## 2025-04-08 PROCEDURE — 86780 TREPONEMA PALLIDUM: CPT

## 2025-04-08 PROCEDURE — 90715 TDAP VACCINE 7 YRS/> IM: CPT | Mod: JZ

## 2025-04-08 PROCEDURE — 82570 ASSAY OF URINE CREATININE: CPT

## 2025-04-08 PROCEDURE — 0500F INITIAL PRENATAL CARE VISIT: CPT

## 2025-04-08 PROCEDURE — 84156 ASSAY OF PROTEIN URINE: CPT

## 2025-04-08 PROCEDURE — 86762 RUBELLA ANTIBODY: CPT

## 2025-04-08 PROCEDURE — 3074F SYST BP LT 130 MM HG: CPT

## 2025-04-08 PROCEDURE — 87491 CHLMYD TRACH DNA AMP PROBE: CPT

## 2025-04-08 PROCEDURE — 85027 COMPLETE CBC AUTOMATED: CPT

## 2025-04-08 PROCEDURE — 86900 BLOOD TYPING SEROLOGIC ABO: CPT

## 2025-04-08 PROCEDURE — 87591 N.GONORRHOEAE DNA AMP PROB: CPT

## 2025-04-08 PROCEDURE — 3078F DIAST BP <80 MM HG: CPT

## 2025-04-08 PROCEDURE — 82950 GLUCOSE TEST: CPT

## 2025-04-08 PROCEDURE — 87340 HEPATITIS B SURFACE AG IA: CPT

## 2025-04-08 PROCEDURE — 86803 HEPATITIS C AB TEST: CPT

## 2025-04-08 PROCEDURE — 80307 DRUG TEST PRSMV CHEM ANLYZR: CPT

## 2025-04-08 PROCEDURE — 86901 BLOOD TYPING SEROLOGIC RH(D): CPT

## 2025-04-08 ASSESSMENT — FIBROSIS 4 INDEX: FIB4 SCORE: 1.11

## 2025-04-08 ASSESSMENT — EDINBURGH POSTNATAL DEPRESSION SCALE (EPDS)
I HAVE BEEN SO UNHAPPY THAT I HAVE HAD DIFFICULTY SLEEPING: NOT AT ALL
THE THOUGHT OF HARMING MYSELF HAS OCCURRED TO ME: NEVER
I HAVE BEEN ABLE TO LAUGH AND SEE THE FUNNY SIDE OF THINGS: AS MUCH AS I ALWAYS COULD
I HAVE BLAMED MYSELF UNNECESSARILY WHEN THINGS WENT WRONG: NO, NEVER
I HAVE LOOKED FORWARD WITH ENJOYMENT TO THINGS: AS MUCH AS I EVER DID
I HAVE FELT SAD OR MISERABLE: NO, NOT AT ALL
TOTAL SCORE: 1
THINGS HAVE BEEN GETTING ON TOP OF ME: NO, MOST OF THE TIME I HAVE COPED QUITE WELL
I HAVE FELT SCARED OR PANICKY FOR NO GOOD REASON: NO, NOT AT ALL
I HAVE BEEN ANXIOUS OR WORRIED FOR NO GOOD REASON: NO, NOT AT ALL
I HAVE BEEN SO UNHAPPY THAT I HAVE BEEN CRYING: NO, NEVER

## 2025-04-08 NOTE — PROGRESS NOTES
Risk factors:   grand multip, AMA, hx preE  Referrals made today:   none      Subjective:   Karlene Arriaza is a 34 y.o.  who presents for her new OB exam. She is 30w5d with an LUCINDA of Estimated Date of Delivery: 25 based off of LMP. This was unplanned--she did not want this pregnancy and had planned a BTL though was unable to make that appt. She feels overall wellw ith some leg cramping.     Reports some previous care in [facility] during this pregnancy, though no records are available.    ROS:  Denies weakness, fatigue, or malaise  Denies headache, changes in vision  Denies constipation, diarrhea  Denies dysuria  Denies changes in appetite, nausea, vomiting  Denies vaginal bleeding, leaking of fluid, discharge, irritation  Denies depression, anxiety  Denies cramping/contractions  Reports fetal movement    No Known Allergies     Past Medical History:   Diagnosis Date    Gestational hypertension, third trimester 2023     History of Varicella: no  History of HSV I or II in self or partner: no  History of Thyroid problems: no    OB History    Para Term  AB Living   7 6 5 1  6   SAB IAB Ectopic Molar Multiple Live Births       0 6      # Outcome Date GA Lbr Driss/2nd Weight Sex Type Anes PTL Lv   7 Current            6  23 35w1d / 00:05 4 lb 10.8 oz M Vag-Spont EPI Y SANDHYA   5 Term  40w0d  5 lb F Vag-Spont   SANDHYA   4 Term  40w0d  6 lb F Vag-Spont   SANDHYA   3 Term  40w0d  5 lb F Vag-Spont   SANDHYA   2 Term  40w0d  5 lb F Vag-Spont   SANDHYA   1 Term  40w0d  6 lb M Vag-Spont   SANDHYA     Reports six previous uncomplicated pregnancies and births, though her last was born premature (35w1d). Per her hospital record from her previous delivery, she also had preeclampsia with severe features and PP hemorrhage     Family History   Problem Relation Age of Onset    No Known Problems Mother     Diabetes Father      denies hx family genetic conditions    Social History     Socioeconomic  History    Marital status:      Spouse name: Not on file    Number of children: Not on file    Years of education: Not on file    Highest education level: Not on file   Occupational History    Not on file   Tobacco Use    Smoking status: Never    Smokeless tobacco: Not on file   Vaping Use    Vaping status: Never Used   Substance and Sexual Activity    Alcohol use: Not Currently    Drug use: Never    Sexual activity: Yes     Partners: Male     Birth control/protection: None   Other Topics Concern    Not on file   Social History Narrative    Not on file     Social Drivers of Health     Financial Resource Strain: Not on file   Food Insecurity: Not on file   Transportation Needs: Not on file   Physical Activity: Not on file   Stress: Not on file   Social Connections: Not on file   Intimate Partner Violence: Not on file   Housing Stability: Not on file     denies current smoking, alcohol use, illicit drug use    Objective:  BP 96/66   Wt 187 lb 6.4 oz      FHTs: 150    Well nourished female in no acute distress  AAO x 3  Heart RRR  Lungs clear to auscultation bilaterally  No edema noted, pulses WNL bilaterally in lower extremities  BS x 4; no guarding or tenderness  Uterus: gravid, approx 33 cm    Assessment:        1.  IUP @ 30w5d per LMP        2.  S=D        3.  See problem list below       Patient Active Problem List    Diagnosis Date Noted    AMA (advanced maternal age) multigravida 35+, first trimester 04/08/2025    Grand multiparity 04/08/2025    ASCUS of cervix with negative high risk HPV 10/09/2023       Plan:        1.  GC/CT added to urine per pts preference         2.  Prenatal labs ordered, PIH baseline labs added - lab slip given        3.  Encouraged PNV; diet with high protein snacks and limited sugar/sugary beverages, adequate water intake; and foods/medications/exposures to avoid        4.  NOB packet given        5.  Discussed carrier screening and genetic testing options; desires NIPT         6.  Complete anatomy OB US ASAP        7.  BTL signed today, pt also desires IUD to be placed in the hospital        8.  Return to office in 2 wks    Orders Placed This Encounter    US-OB 2ND 3RD TRI COMPLETE    TDAP VACCINE =>6YO IM    PREG CNTR PRENATAL PN    URINE DRUG SCREEN W/CONF (AR)    VAGINAL PATHOGENS DNA PANEL    Chlamydia/GC, PCR (Genital/Anal swab)    PANORAMA PRENATAL TEST         Renuka Humphrey CNM

## 2025-04-08 NOTE — LETTER
"Count Your Baby's Movements  Another step to a healthy delivery    Karlene Arriaza  [facility]  Dept: [phone]    How Many Weeks Pregnant? Unknown    Date to Begin Counting: Today !!              How to use this chart    One way for your physician to keep track of your baby's health is by knowing how often the baby moves (or \"kicks\") in your womb.  You can help your physician to do this by using this chart every day.    Every day, you should see how many hours it takes for your baby to move 10 times.  Start in the morning, as soon as you get up.    First, write down the time your baby moves until you get to 10.  Check off one box every time your baby moves until you get to 10.  Write down the time you finished counting in the last column.  Total how long it took to count up all 10 movements.  Finally, fill in the box that shows how long this took.  After counting 10 movements, you no longer have to count any more that day.  The next morning, just start counting again as soon as you get up.    What should you call a \"movement\"?  It is hard to say, because it will feel different from one mother to another and from one pregnancy to the next.  The important thing is that you count the movements the same way throughout your pregnancy.  If you have more questions, you should ask your physician.    Count carefully every day!  SAMPLE:  Week 28    How many hours did it take to feel 10 movements?       Start  Time     1     2     3     4     5     6     7     8     9     10   Finish Time   Mon 8:20           11:40   Tue Wed Thu Fri               Sat               Sun                 IMPORTANT: You should contact your physician if it takes more than two hours for you to feel 10 movements.  Each morning, write down the time and start to count the movements of your baby.  Keep track by checking off one box every time you feel one movement.  When you " "have felt 10 \"kicks\", write down the time you finished counting in the last column.  Then fill in the   box (over the check radha) for the number of hours it took.  Be sure to read the complete instructions on the previous page.            "

## 2025-04-08 NOTE — PROGRESS NOTES
Pt here for NOB apt.   Last pap : 9/2023 - ASCUS w/ -HPV   Prenatal care : None   LMP = 9/5/24  LUCINDA = 6/12/25 by LMP   US : None   GA today = 30w5d aprox  EPDS = 1  Pt is interested in genetic testing.   MINERVA sheet explained and given.   Tdap given.   BTL signed.   Pt states she has been getting bad calf cramps usually at night when she is sleeping.   Phone/Pharm verified.     PGW unknown.

## 2025-04-09 LAB
C TRACH DNA SPEC QL NAA+PROBE: NEGATIVE
CREAT UR-MCNC: 190 MG/DL
N GONORRHOEA DNA SPEC QL NAA+PROBE: NEGATIVE
PROT UR-MCNC: 17.7 MG/DL (ref 0–15)
PROT/CREAT UR: 93 MG/G (ref 10–107)
SPECIMEN SOURCE: NORMAL

## 2025-04-10 ENCOUNTER — RESULTS FOLLOW-UP (OUTPATIENT)
Dept: OBGYN | Facility: MEDICAL CENTER | Age: 35
End: 2025-04-10
Payer: MEDICAID

## 2025-04-10 ENCOUNTER — TELEPHONE (OUTPATIENT)
Dept: OBGYN | Facility: CLINIC | Age: 35
End: 2025-04-10
Payer: MEDICAID

## 2025-04-10 LAB
AMPHET CTO UR CFM-MCNC: NEGATIVE NG/ML
BACTERIA UR CULT: NORMAL
BARBITURATES CTO UR CFM-MCNC: NEGATIVE NG/ML
BENZODIAZ CTO UR CFM-MCNC: NEGATIVE NG/ML
CANNABINOIDS CTO UR CFM-MCNC: NEGATIVE NG/ML
COCAINE CTO UR CFM-MCNC: NEGATIVE NG/ML
CREAT UR-MCNC: 179.2 MG/DL (ref 20–400)
DRUG COMMENT 753798: NORMAL
METHADONE CTO UR CFM-MCNC: NEGATIVE NG/ML
OPIATES CTO UR CFM-MCNC: NEGATIVE NG/ML
PCP CTO UR CFM-MCNC: NEGATIVE NG/ML
PROPOXYPH CTO UR CFM-MCNC: NEGATIVE NG/ML
SIGNIFICANT IND 70042: NORMAL
SITE SITE: NORMAL
SOURCE SOURCE: NORMAL

## 2025-04-10 RX ORDER — FERROUS SULFATE 325(65) MG
325 TABLET ORAL DAILY
Qty: 90 TABLET | Refills: 2 | Status: SHIPPED | OUTPATIENT
Start: 2025-04-10

## 2025-04-10 NOTE — TELEPHONE ENCOUNTER
Renuka Humphrey C.N.M. to [facility]  (Selected Message)  TG    4/10/25  6:30 AM  Result Note  Please call pt to notify her of anemia. She should take oral iron twice daily. Rx has been sent.    4/10/2025 0951  Left message for pt to call back regarding lab results.   Sent Pt MyChart message re: result and RX  Will route to provider to clarify amount to take each day.    4/15/2025  Sent pt MyChart message with Renuka's recommendation of taking 2 Ferrous Sulphate tablets a day.    1658  Pt read MyChart message

## 2025-04-16 LAB
Lab: NORMAL
NTRA 1P36 DELETION SYNDROME POPULATION-BASED RISK TEXT: NORMAL
NTRA 1P36 DELETION SYNDROME RESULT TEXT: NORMAL
NTRA 1P36 DELETION SYNDROME RISK SCORE TEXT: NORMAL
NTRA 22Q11.2 DELETION SYNDROME POPULATION-BASED RISK TEXT: NORMAL
NTRA 22Q11.2 DELETION SYNDROME RESULT TEXT: NORMAL
NTRA 22Q11.2 DELETION SYNDROME RISK SCORE TEXT: NORMAL
NTRA ANGELMAN SYNDROME POPULATION-BASED RISK TEXT: NORMAL
NTRA ANGELMAN SYNDROME RESULT TEXT: NORMAL
NTRA ANGELMAN SYNDROME RISK SCORE TEXT: NORMAL
NTRA CRI-DU-CHAT SYNDROME POPULATION-BASED RISK TEXT: NORMAL
NTRA CRI-DU-CHAT SYNDROME RESULT TEXT: NORMAL
NTRA CRI-DU-CHAT SYNDROME RISK SCORE TEXT: NORMAL
NTRA FETAL FRACTION: NORMAL
NTRA GENDER OF FETUS: NORMAL
NTRA MONOSOMY X AGE-BASED RISK TEXT: NORMAL
NTRA MONOSOMY X RESULT TEXT: NORMAL
NTRA MONOSOMY X RISK SCORE TEXT: NORMAL
NTRA PRADER-WILLI SYNDROME POPULATION-BASED RISK TEXT: NORMAL
NTRA PRADER-WILLI SYNDROME RESULT TEXT: NORMAL
NTRA PRADER-WILLI SYNDROME RISK SCORE TEXT: NORMAL
NTRA TRIPLOIDY RESULT TEXT: NORMAL
NTRA TRISOMY 13 AGE-BASED RISK TEXT: NORMAL
NTRA TRISOMY 13 RESULT TEXT: NORMAL
NTRA TRISOMY 13 RISK SCORE TEXT: NORMAL
NTRA TRISOMY 18 AGE-BASED RISK TEXT: NORMAL
NTRA TRISOMY 18 RESULT TEXT: NORMAL
NTRA TRISOMY 18 RISK SCORE TEXT: NORMAL
NTRA TRISOMY 21 AGE-BASED RISK TEXT: NORMAL
NTRA TRISOMY 21 RESULT TEXT: NORMAL
NTRA TRISOMY 21 RISK SCORE TEXT: NORMAL

## 2025-04-22 ENCOUNTER — ROUTINE PRENATAL (OUTPATIENT)
Dept: OBGYN | Facility: CLINIC | Age: 35
End: 2025-04-22

## 2025-04-22 VITALS — DIASTOLIC BLOOD PRESSURE: 60 MMHG | SYSTOLIC BLOOD PRESSURE: 100 MMHG | BODY MASS INDEX: 34.57 KG/M2 | WEIGHT: 189 LBS

## 2025-04-22 DIAGNOSIS — O09.521 AMA (ADVANCED MATERNAL AGE) MULTIGRAVIDA 35+, FIRST TRIMESTER: ICD-10-CM

## 2025-04-22 PROCEDURE — 0502F SUBSEQUENT PRENATAL CARE: CPT

## 2025-04-22 PROCEDURE — 3074F SYST BP LT 130 MM HG: CPT

## 2025-04-22 PROCEDURE — 3078F DIAST BP <80 MM HG: CPT

## 2025-04-22 ASSESSMENT — FIBROSIS 4 INDEX: FIB4 SCORE: 0.78

## 2025-04-22 NOTE — PROGRESS NOTES
Pt here today for OBFV   +FM movemnet  No VB, LOF. 's   Phone number [phone] (home)   Pharmacy verified   US 5/5

## 2025-04-22 NOTE — PROGRESS NOTES
S: Pt is a 34 y.o.  at 32w5d gestation here today for routine prenatal care. Pt reports fetal movement; denies cramping, vaginal bleeding, and leaking of fluid. Denies controls today.       O: /60   Wt 189 lb    *see prenatal flowsheet*     A: IUP at 32w5d       S=D         Patient Active Problem List    Diagnosis Date Noted    AMA (advanced maternal age) multigravida 35+, first trimester 2025    Grand multiparity 2025    Desires BTL, please place post-placental IUD 2025    ASCUS of cervix with negative high risk HPV 10/09/2023       P:   -Discussed normal s/sx pregnancy appropriate for gestational age general discomforts vs warning signs that necessitate evaluation in OB triage. Reviewed fetal movements appropriate for EGA. Reinforced adequate hydration and nutrition.  -Discussed labor precautions, including 5-1-1 rule. Instructed to report to hospital with leaking of fluid, concerns for decreased FM, excessive vaginal bleeding.   -Discussed signs and symptoms of preeclampsia, including HA that is not resolved by rest/Tylenol/hydration, changes in vision, RUQ pain, or sudden increase in swelling.   -GBS at NV  -RT in 1 weeks for routine prenatal care      Renuka Humphrey C.N.M.

## 2025-05-01 ENCOUNTER — HOSPITAL ENCOUNTER (OUTPATIENT)
Dept: LAB | Facility: MEDICAL CENTER | Age: 35
End: 2025-05-01
Payer: MEDICAID

## 2025-05-01 ENCOUNTER — APPOINTMENT (OUTPATIENT)
Dept: OBGYN | Facility: CLINIC | Age: 35
End: 2025-05-01
Payer: MEDICAID

## 2025-05-01 VITALS — WEIGHT: 188 LBS | BODY MASS INDEX: 34.39 KG/M2 | SYSTOLIC BLOOD PRESSURE: 102 MMHG | DIASTOLIC BLOOD PRESSURE: 70 MMHG

## 2025-05-01 DIAGNOSIS — O09.299 HX OF PREECLAMPSIA, PRIOR PREGNANCY, CURRENTLY PREGNANT: ICD-10-CM

## 2025-05-01 DIAGNOSIS — O09.521 AMA (ADVANCED MATERNAL AGE) MULTIGRAVIDA 35+, FIRST TRIMESTER: ICD-10-CM

## 2025-05-01 DIAGNOSIS — O99.019 ANTEPARTUM ANEMIA: ICD-10-CM

## 2025-05-01 LAB
ALBUMIN SERPL BCP-MCNC: 3.4 G/DL (ref 3.2–4.9)
ALBUMIN/GLOB SERPL: 0.9 G/DL
ALP SERPL-CCNC: 117 U/L (ref 30–99)
ALT SERPL-CCNC: 6 U/L (ref 2–50)
ANION GAP SERPL CALC-SCNC: 9 MMOL/L (ref 7–16)
ANISOCYTOSIS BLD QL SMEAR: ABNORMAL
AST SERPL-CCNC: 20 U/L (ref 12–45)
BASOPHILS # BLD AUTO: 0.4 % (ref 0–1.8)
BASOPHILS # BLD: 0.05 K/UL (ref 0–0.12)
BILIRUB SERPL-MCNC: 0.4 MG/DL (ref 0.1–1.5)
BUN SERPL-MCNC: 10 MG/DL (ref 8–22)
CALCIUM ALBUM COR SERPL-MCNC: 9.4 MG/DL (ref 8.5–10.5)
CALCIUM SERPL-MCNC: 8.9 MG/DL (ref 8.5–10.5)
CHLORIDE SERPL-SCNC: 105 MMOL/L (ref 96–112)
CO2 SERPL-SCNC: 22 MMOL/L (ref 20–33)
COMMENT 1642: NORMAL
CREAT SERPL-MCNC: 0.71 MG/DL (ref 0.5–1.4)
EOSINOPHIL # BLD AUTO: 0.42 K/UL (ref 0–0.51)
EOSINOPHIL NFR BLD: 3.8 % (ref 0–6.9)
ERYTHROCYTE [DISTWIDTH] IN BLOOD BY AUTOMATED COUNT: 48.7 FL (ref 35.9–50)
FERRITIN SERPL-MCNC: 9.6 NG/ML (ref 10–291)
GFR SERPLBLD CREATININE-BSD FMLA CKD-EPI: 113 ML/MIN/1.73 M 2
GLOBULIN SER CALC-MCNC: 3.9 G/DL (ref 1.9–3.5)
GLUCOSE SERPL-MCNC: 79 MG/DL (ref 65–99)
HCT VFR BLD AUTO: 30 % (ref 37–47)
HGB BLD-MCNC: 8.5 G/DL (ref 12–16)
HYPOCHROMIA BLD QL SMEAR: ABNORMAL
IMM GRANULOCYTES # BLD AUTO: 0.04 K/UL (ref 0–0.11)
IMM GRANULOCYTES NFR BLD AUTO: 0.4 % (ref 0–0.9)
IRON SATN MFR SERPL: 4 % (ref 15–55)
IRON SERPL-MCNC: 18 UG/DL (ref 40–170)
LYMPHOCYTES # BLD AUTO: 2.24 K/UL (ref 1–4.8)
LYMPHOCYTES NFR BLD: 20.1 % (ref 22–41)
MCH RBC QN AUTO: 20.6 PG (ref 27–33)
MCHC RBC AUTO-ENTMCNC: 28.3 G/DL (ref 32.2–35.5)
MCV RBC AUTO: 72.6 FL (ref 81.4–97.8)
MICROCYTES BLD QL SMEAR: ABNORMAL
MONOCYTES # BLD AUTO: 0.68 K/UL (ref 0–0.85)
MONOCYTES NFR BLD AUTO: 6.1 % (ref 0–13.4)
MORPHOLOGY BLD-IMP: NORMAL
NEUTROPHILS # BLD AUTO: 7.73 K/UL (ref 1.82–7.42)
NEUTROPHILS NFR BLD: 69.2 % (ref 44–72)
NRBC # BLD AUTO: 0 K/UL
NRBC BLD-RTO: 0 /100 WBC (ref 0–0.2)
PLATELET # BLD AUTO: 321 K/UL (ref 164–446)
PLATELET BLD QL SMEAR: NORMAL
PMV BLD AUTO: 9.6 FL (ref 9–12.9)
POTASSIUM SERPL-SCNC: 3.9 MMOL/L (ref 3.6–5.5)
PROT SERPL-MCNC: 7.3 G/DL (ref 6–8.2)
RBC # BLD AUTO: 4.13 M/UL (ref 4.2–5.4)
RBC BLD AUTO: PRESENT
SODIUM SERPL-SCNC: 136 MMOL/L (ref 135–145)
TIBC SERPL-MCNC: 480 UG/DL (ref 250–450)
UIBC SERPL-MCNC: 462 UG/DL (ref 110–370)
WBC # BLD AUTO: 11.2 K/UL (ref 4.8–10.8)

## 2025-05-01 PROCEDURE — 3074F SYST BP LT 130 MM HG: CPT

## 2025-05-01 PROCEDURE — 82728 ASSAY OF FERRITIN: CPT

## 2025-05-01 PROCEDURE — 0502F SUBSEQUENT PRENATAL CARE: CPT

## 2025-05-01 PROCEDURE — 83021 HEMOGLOBIN CHROMOTOGRAPHY: CPT

## 2025-05-01 PROCEDURE — 80053 COMPREHEN METABOLIC PANEL: CPT

## 2025-05-01 PROCEDURE — 3078F DIAST BP <80 MM HG: CPT

## 2025-05-01 PROCEDURE — 36415 COLL VENOUS BLD VENIPUNCTURE: CPT

## 2025-05-01 PROCEDURE — 83550 IRON BINDING TEST: CPT

## 2025-05-01 PROCEDURE — 83540 ASSAY OF IRON: CPT

## 2025-05-01 PROCEDURE — 85025 COMPLETE CBC W/AUTO DIFF WBC: CPT

## 2025-05-01 ASSESSMENT — FIBROSIS 4 INDEX: FIB4 SCORE: 0.78

## 2025-05-01 NOTE — PROGRESS NOTES
S: Pt is a 34 y.o.  at 34w0d gestation here today for routine prenatal care.     Concerns today include:  {AHReports:98728}    Denies: {AHDENIESROB:93980}    Pt reports fetal movement as {fetalmvmt:86170}     O: LMP 2024 (Exact Date)    *see prenatal flowsheet*     Labs:     Prenatal labs: Completed. Abnormal: anemia, rubella non-immune   GTT: 89   GBS: Not yet collected  Genetic testing: NIPT low risk   STI testing: negative    Ultrasound: none since last visit     A/P:     Problem List Items Addressed This Visit    None

## 2025-05-01 NOTE — PROGRESS NOTES
Pt here for a OB follow up   GA: 34w 0d    Pt states: she was lightheaded yesterday and has a history of preeclampsia   + fetal movement.  No VB, LOF, UC's.  Phone # [phone]  Pharmacy Verified.  Tdap : 04/08  Ultrasound : 05/05  Labs : complete   Btl : is signed

## 2025-05-01 NOTE — ASSESSMENT & PLAN NOTE
Iron studies and hemoglobinopathy profile ordered to guide treatment, encouraged to go to lab immediately following visit. Discussed may need IV iron transfusion, pt agreeable. Will follow up with results.

## 2025-05-01 NOTE — ASSESSMENT & PLAN NOTE
Pt is a 34 y.o.  at 34w0d gestation here today for routine prenatal care.   Size equal to dates    Discuss  labor and pre-eclampsia precautions.  Discuss FMA and FKCs  Address concerns: danger signs given, increase water, slow position changes, avoid standing for long periods of time.   GBS Not yet collected  Rh positive  Information given on WIC.  US scheduled 25  RTC 2 wks with NST.

## 2025-05-03 LAB
HGB A1 MFR BLD: 97.3 % (ref 95–97.9)
HGB A2 MFR BLD: 2.5 % (ref 2–3.5)
HGB C MFR BLD: 0 % (ref 0–0)
HGB E MFR BLD: 0 % (ref 0–0)
HGB F MFR BLD: 0.2 % (ref 0–2.1)
HGB FRACT BLD ELPH-IMP: NORMAL
HGB OTHER MFR BLD: 0 % (ref 0–0)
HGB S BLD QL SOLY: NORMAL
HGB S MFR BLD: 0 % (ref 0–0)
PATH INTERP BLD-IMP: NORMAL

## 2025-05-05 ENCOUNTER — APPOINTMENT (OUTPATIENT)
Dept: RADIOLOGY | Facility: MEDICAL CENTER | Age: 35
End: 2025-05-05
Payer: MEDICAID

## 2025-05-05 DIAGNOSIS — O09.93 HIGH-RISK PREGNANCY IN THIRD TRIMESTER: ICD-10-CM

## 2025-05-05 PROCEDURE — 76805 OB US >/= 14 WKS SNGL FETUS: CPT

## 2025-05-06 ENCOUNTER — RESULTS FOLLOW-UP (OUTPATIENT)
Dept: OBGYN | Facility: CLINIC | Age: 35
End: 2025-05-06
Payer: COMMERCIAL

## 2025-05-06 DIAGNOSIS — O99.019 ANTEPARTUM ANEMIA: ICD-10-CM

## 2025-05-06 RX ORDER — ONDANSETRON 2 MG/ML
8 INJECTION INTRAMUSCULAR; INTRAVENOUS PRN
OUTPATIENT
Start: 2025-05-06

## 2025-05-06 RX ORDER — METHYLPREDNISOLONE SODIUM SUCCINATE 125 MG/2ML
125 INJECTION, POWDER, LYOPHILIZED, FOR SOLUTION INTRAMUSCULAR; INTRAVENOUS PRN
OUTPATIENT
Start: 2025-05-06

## 2025-05-06 RX ORDER — EPINEPHRINE 1 MG/ML(1)
0.5 AMPUL (ML) INJECTION PRN
OUTPATIENT
Start: 2025-05-06

## 2025-05-06 RX ORDER — ACETAMINOPHEN 325 MG/1
650 TABLET ORAL PRN
OUTPATIENT
Start: 2025-05-06

## 2025-05-06 RX ORDER — SODIUM CHLORIDE 9 MG/ML
INJECTION, SOLUTION INTRAVENOUS CONTINUOUS
OUTPATIENT
Start: 2025-05-06

## 2025-05-06 RX ORDER — ALBUTEROL SULFATE 5 MG/ML
2.5 SOLUTION RESPIRATORY (INHALATION) PRN
OUTPATIENT
Start: 2025-05-06

## 2025-05-06 RX ORDER — SODIUM CHLORIDE 9 MG/ML
1000 INJECTION, SOLUTION INTRAVENOUS PRN
OUTPATIENT
Start: 2025-05-06

## 2025-05-06 RX ORDER — DIPHENHYDRAMINE HYDROCHLORIDE 50 MG/ML
25 INJECTION, SOLUTION INTRAMUSCULAR; INTRAVENOUS PRN
OUTPATIENT
Start: 2025-05-06

## 2025-05-06 RX ORDER — ONDANSETRON 8 MG/1
8 TABLET, ORALLY DISINTEGRATING ORAL PRN
OUTPATIENT
Start: 2025-05-06

## 2025-05-06 NOTE — TELEPHONE ENCOUNTER
----- Message from Nurse Practitioner DARCIE Granados sent at 5/6/2025  1:05 PM PDT -----  IV iron ordered, please notify patient.     5/6/2025 1443  Called pt and notified as above. Informed pt that Infusion services should be calling her to scheduled. Pt agreed and verbalized understanding.

## 2025-05-09 ENCOUNTER — ROUTINE PRENATAL (OUTPATIENT)
Dept: OBGYN | Facility: CLINIC | Age: 35
End: 2025-05-09
Payer: MEDICAID

## 2025-05-09 VITALS — BODY MASS INDEX: 34.75 KG/M2 | DIASTOLIC BLOOD PRESSURE: 70 MMHG | SYSTOLIC BLOOD PRESSURE: 110 MMHG | WEIGHT: 190 LBS

## 2025-05-09 DIAGNOSIS — O09.521 AMA (ADVANCED MATERNAL AGE) MULTIGRAVIDA 35+, FIRST TRIMESTER: ICD-10-CM

## 2025-05-09 DIAGNOSIS — Z30.2 REQUEST FOR STERILIZATION: ICD-10-CM

## 2025-05-09 PROCEDURE — 0502F SUBSEQUENT PRENATAL CARE: CPT | Performed by: ADVANCED PRACTICE MIDWIFE

## 2025-05-09 PROCEDURE — 3074F SYST BP LT 130 MM HG: CPT | Performed by: ADVANCED PRACTICE MIDWIFE

## 2025-05-09 PROCEDURE — 3078F DIAST BP <80 MM HG: CPT | Performed by: ADVANCED PRACTICE MIDWIFE

## 2025-05-09 ASSESSMENT — FIBROSIS 4 INDEX: FIB4 SCORE: 0.92

## 2025-05-09 NOTE — PROGRESS NOTES
Pt here today for OB follow up  Pt states no complaints   Reports +  Good # [phone]  Pharmacy Confirmed.  Chaperone offered and not indicated.   Pt has not scheduled Iron IV. States has not received phone call.

## 2025-05-09 NOTE — PROGRESS NOTES
S: Pt is a 34 y.o.  at 35w1d gestation here today for routine prenatal care.     Concerns today include:  Denies concerns    Denies: vaginal bleeding, pelvic and abdominal pain, cramping, contractions, leaking of fluid, urinary and vaginal symptoms    Pt reports fetal movement as Present     O: /70   Wt 190 lb   LMP 2024 (Exact Date)   BMI 34.75 kg/m²    *see prenatal flowsheet*     Labs:     Prenatal labs: Completed and normal      A/P:     Problem List Items Addressed This Visit          Unprioritized    AMA (advanced maternal age) multigravida 35+, first trimester    Pt is a 34 y.o.  at 35w1d gestation here today for routine prenatal care.   Size equal to dates      Discuss  labor and pre-eclampsia precautions.  Discuss FMA and FKCs  Address concerns: IV iron transfusion. Open to blood products if medically necessary. NSTs to start at next visit.   GBS Not yet collected  Rh positive  Desires BTL/IUD for contraception postpartum.   RTC 1 wks.           Desires BTL, please place post-placental IUD

## 2025-05-09 NOTE — ASSESSMENT & PLAN NOTE
Pt is a 34 y.o.  at 35w1d gestation here today for routine prenatal care.   Size equal to dates      Discuss  labor and pre-eclampsia precautions.  Discuss FMA and FKCs  Address concerns: IV iron transfusion. Open to blood products if medically necessary. NSTs to start at next visit.   GBS Not yet collected  Rh positive  Desires BTL/IUD for contraception postpartum.   RTC 1 wks.

## 2025-05-22 ENCOUNTER — TELEPHONE (OUTPATIENT)
Dept: OBGYN | Facility: CLINIC | Age: 35
End: 2025-05-22
Payer: COMMERCIAL

## 2025-05-22 NOTE — TELEPHONE ENCOUNTER
Called pt regarding her [place]. Wanted to confirm the specific dates she would like listed on the forms before proceeding. LVM asking her to call us back at her earliest convenience with the requested information.

## 2025-05-23 ENCOUNTER — ROUTINE PRENATAL (OUTPATIENT)
Dept: OBGYN | Facility: CLINIC | Age: 35
End: 2025-05-23
Payer: COMMERCIAL

## 2025-05-23 ENCOUNTER — HOSPITAL ENCOUNTER (OUTPATIENT)
Facility: MEDICAL CENTER | Age: 35
End: 2025-05-23
Payer: MEDICAID

## 2025-05-23 VITALS — BODY MASS INDEX: 35.3 KG/M2 | DIASTOLIC BLOOD PRESSURE: 68 MMHG | SYSTOLIC BLOOD PRESSURE: 122 MMHG | WEIGHT: 193 LBS

## 2025-05-23 DIAGNOSIS — O99.019 ANTEPARTUM ANEMIA: ICD-10-CM

## 2025-05-23 DIAGNOSIS — O09.523 AMA (ADVANCED MATERNAL AGE) MULTIGRAVIDA 35+, THIRD TRIMESTER: ICD-10-CM

## 2025-05-23 DIAGNOSIS — O09.523 AMA (ADVANCED MATERNAL AGE) MULTIGRAVIDA 35+, THIRD TRIMESTER: Primary | ICD-10-CM

## 2025-05-23 PROCEDURE — 87081 CULTURE SCREEN ONLY: CPT

## 2025-05-23 PROCEDURE — 87150 DNA/RNA AMPLIFIED PROBE: CPT

## 2025-05-23 PROCEDURE — 0502F SUBSEQUENT PRENATAL CARE: CPT

## 2025-05-23 PROCEDURE — 59025 FETAL NON-STRESS TEST: CPT

## 2025-05-23 ASSESSMENT — FIBROSIS 4 INDEX: FIB4 SCORE: 0.92

## 2025-05-23 NOTE — PROGRESS NOTES
Pt. Here for OB/FU. Reports Good FM.   Good # verified  Pt. Denies VB, LOF, or UC's.   Pharmacy verified.   Chaperone offered and not indicated

## 2025-05-23 NOTE — PROGRESS NOTES
S: 34 y.o.  at 37w1d presents for routine obstetric follow-up.   Good fetal movement.  No contractions, vaginal bleeding, or leakage of fluid.    Denies persistent headaches, vision changes, abd pain, swelling of hands/face, dysuria, N/V.   Generally feels well today.  Questions answered.    O: /68   Wt 193 lb   LMP 2024 (Exact Date)   BMI 35.30 kg/m²   Patients' weight gain, fluid intake and exercise level discussed.  Vitals, fundal height , fetal position, and FHR reviewed on flowsheet    Lab:No results found for this or any previous visit (from the past 2 weeks).  Recent US: 2025 JESSICA: 8.74 cm. Cervical length: 3.52 cm. EFW 2243g 21%. Fetal lie: vertex. Placenta: Anterior  TDaP: Administered 2025  NIPT: low-risk   msAFP: not completed   1hr OGTT: 89 mg/dL  STI: Non-reactive syphilis, HIV, Hepatitis B and C screening. CT/NG negative  Rh: positive  BTL: desires     NST   Karlene M Sofiya   Gestational age: 37w1d   Indications: AMA  Baseline 125, reactive, Variability  6-25 BPM, Accelerations: Yes, Decelerations: No      A/P:  34 y.o.  at 37w1d presents for routine obstetric follow-up.  Size equals dates and/or scan  - GBS collected today   - Continue ferrous sulfate and prenatal vitamins, pills not gummies.  - Fetal kick counts.  - Exercise at least 30 minutes daily. Drink at least 3-4L of water daily  - Labor precautions educated.    Follow-up in 1 weeks.    BART Mackenzie.  [facility]

## 2025-05-25 LAB — GP B STREP DNA SPEC QL NAA+PROBE: POSITIVE

## 2025-05-28 ENCOUNTER — RESULTS FOLLOW-UP (OUTPATIENT)
Dept: OBGYN | Facility: CLINIC | Age: 35
End: 2025-05-28
Payer: COMMERCIAL

## 2025-05-30 ENCOUNTER — APPOINTMENT (OUTPATIENT)
Dept: OBGYN | Facility: CLINIC | Age: 35
End: 2025-05-30
Payer: COMMERCIAL

## 2025-06-01 ENCOUNTER — OUTPATIENT INFUSION SERVICES (OUTPATIENT)
Dept: ONCOLOGY | Facility: MEDICAL CENTER | Age: 35
End: 2025-06-01
Payer: COMMERCIAL

## 2025-06-01 VITALS
DIASTOLIC BLOOD PRESSURE: 79 MMHG | OXYGEN SATURATION: 96 % | WEIGHT: 194 LBS | HEIGHT: 62 IN | BODY MASS INDEX: 35.7 KG/M2 | TEMPERATURE: 97.5 F | SYSTOLIC BLOOD PRESSURE: 115 MMHG | RESPIRATION RATE: 18 BRPM | HEART RATE: 93 BPM

## 2025-06-01 DIAGNOSIS — O99.019 ANTEPARTUM ANEMIA: Primary | ICD-10-CM

## 2025-06-01 PROCEDURE — 700111 HCHG RX REV CODE 636 W/ 250 OVERRIDE (IP): Mod: JZ,UD

## 2025-06-01 PROCEDURE — 700105 HCHG RX REV CODE 258: Mod: UD

## 2025-06-01 PROCEDURE — 96365 THER/PROPH/DIAG IV INF INIT: CPT

## 2025-06-01 RX ORDER — ACETAMINOPHEN 325 MG/1
650 TABLET ORAL PRN
Status: CANCELLED | OUTPATIENT
Start: 2025-06-01

## 2025-06-01 RX ORDER — SODIUM CHLORIDE 9 MG/ML
1000 INJECTION, SOLUTION INTRAVENOUS PRN
Status: DISCONTINUED | OUTPATIENT
Start: 2025-06-01 | End: 2025-06-01 | Stop reason: HOSPADM

## 2025-06-01 RX ORDER — ONDANSETRON 8 MG/1
8 TABLET, ORALLY DISINTEGRATING ORAL PRN
Status: DISCONTINUED | OUTPATIENT
Start: 2025-06-01 | End: 2025-06-01 | Stop reason: HOSPADM

## 2025-06-01 RX ORDER — DIPHENHYDRAMINE HYDROCHLORIDE 50 MG/ML
25 INJECTION, SOLUTION INTRAMUSCULAR; INTRAVENOUS PRN
Status: DISCONTINUED | OUTPATIENT
Start: 2025-06-01 | End: 2025-06-01 | Stop reason: HOSPADM

## 2025-06-01 RX ORDER — METHYLPREDNISOLONE SODIUM SUCCINATE 125 MG/2ML
125 INJECTION, POWDER, LYOPHILIZED, FOR SOLUTION INTRAMUSCULAR; INTRAVENOUS PRN
Status: CANCELLED | OUTPATIENT
Start: 2025-06-01

## 2025-06-01 RX ORDER — ONDANSETRON 2 MG/ML
8 INJECTION INTRAMUSCULAR; INTRAVENOUS PRN
Status: DISCONTINUED | OUTPATIENT
Start: 2025-06-01 | End: 2025-06-01 | Stop reason: HOSPADM

## 2025-06-01 RX ORDER — ONDANSETRON 2 MG/ML
8 INJECTION INTRAMUSCULAR; INTRAVENOUS PRN
Status: CANCELLED | OUTPATIENT
Start: 2025-06-01

## 2025-06-01 RX ORDER — EPINEPHRINE 1 MG/ML(1)
0.5 AMPUL (ML) INJECTION PRN
Status: CANCELLED | OUTPATIENT
Start: 2025-06-01

## 2025-06-01 RX ORDER — SODIUM CHLORIDE 9 MG/ML
1000 INJECTION, SOLUTION INTRAVENOUS PRN
Status: CANCELLED | OUTPATIENT
Start: 2025-06-01

## 2025-06-01 RX ORDER — SODIUM CHLORIDE 9 MG/ML
INJECTION, SOLUTION INTRAVENOUS CONTINUOUS
Status: CANCELLED | OUTPATIENT
Start: 2025-06-01

## 2025-06-01 RX ORDER — ALBUTEROL SULFATE 5 MG/ML
2.5 SOLUTION RESPIRATORY (INHALATION) PRN
Status: DISCONTINUED | OUTPATIENT
Start: 2025-06-01 | End: 2025-06-01 | Stop reason: HOSPADM

## 2025-06-01 RX ORDER — ALBUTEROL SULFATE 5 MG/ML
2.5 SOLUTION RESPIRATORY (INHALATION) PRN
Status: CANCELLED | OUTPATIENT
Start: 2025-06-01

## 2025-06-01 RX ORDER — METHYLPREDNISOLONE SODIUM SUCCINATE 125 MG/2ML
125 INJECTION, POWDER, LYOPHILIZED, FOR SOLUTION INTRAMUSCULAR; INTRAVENOUS PRN
Status: DISCONTINUED | OUTPATIENT
Start: 2025-06-01 | End: 2025-06-01 | Stop reason: HOSPADM

## 2025-06-01 RX ORDER — ONDANSETRON 8 MG/1
8 TABLET, ORALLY DISINTEGRATING ORAL PRN
Status: CANCELLED | OUTPATIENT
Start: 2025-06-01

## 2025-06-01 RX ORDER — ACETAMINOPHEN 325 MG/1
650 TABLET ORAL PRN
Status: DISCONTINUED | OUTPATIENT
Start: 2025-06-01 | End: 2025-06-01 | Stop reason: HOSPADM

## 2025-06-01 RX ORDER — EPINEPHRINE 1 MG/ML(1)
0.5 AMPUL (ML) INJECTION PRN
Status: DISCONTINUED | OUTPATIENT
Start: 2025-06-01 | End: 2025-06-01 | Stop reason: HOSPADM

## 2025-06-01 RX ORDER — DIPHENHYDRAMINE HYDROCHLORIDE 50 MG/ML
25 INJECTION, SOLUTION INTRAMUSCULAR; INTRAVENOUS PRN
Status: CANCELLED | OUTPATIENT
Start: 2025-06-01

## 2025-06-01 RX ADMIN — SODIUM CHLORIDE 1000 MG: 9 INJECTION, SOLUTION INTRAVENOUS at 14:52

## 2025-06-01 ASSESSMENT — FIBROSIS 4 INDEX: FIB4 SCORE: 0.92

## 2025-06-01 NOTE — PROGRESS NOTES
Patient arrived to [facility] for Iron Dextran infusion, no concerns at this time. PIV started on LAC, brisk blood return noted. Iron Dextran infused at 75ml/hr for 5min and stopped for 10mins, no signs or symptoms of adverse reaction noted. Infusion completed over an hour, well tolerated. PIV flushed and d/c'd, gauze and coban applied, therapy completed, patient to follow up with PCP for further POC. Patient left home in stable condition.

## 2025-06-03 ENCOUNTER — ROUTINE PRENATAL (OUTPATIENT)
Dept: OBGYN | Facility: CLINIC | Age: 35
End: 2025-06-03
Payer: MEDICAID

## 2025-06-03 VITALS — WEIGHT: 193 LBS | SYSTOLIC BLOOD PRESSURE: 110 MMHG | DIASTOLIC BLOOD PRESSURE: 62 MMHG | BODY MASS INDEX: 35.3 KG/M2

## 2025-06-03 DIAGNOSIS — O09.523 AMA (ADVANCED MATERNAL AGE) MULTIGRAVIDA 35+, THIRD TRIMESTER: Primary | ICD-10-CM

## 2025-06-03 PROCEDURE — 3078F DIAST BP <80 MM HG: CPT

## 2025-06-03 PROCEDURE — 3074F SYST BP LT 130 MM HG: CPT

## 2025-06-03 PROCEDURE — 0502F SUBSEQUENT PRENATAL CARE: CPT

## 2025-06-03 ASSESSMENT — FIBROSIS 4 INDEX: FIB4 SCORE: 0.92

## 2025-06-03 NOTE — PROGRESS NOTES
S: Pt is a 34 y.o.  at 38w5d gestation here today for routine prenatal care. Pt reports fetal movement; denies cramping, vaginal bleeding, and leaking of fluid. No concerns today.      O: /62   Wt 193 lb    *see prenatal flowsheet*     A: IUP at 38w5d       S=D         Patient Active Problem List    Diagnosis Date Noted    Antepartum anemia 2025    AMA (advanced maternal age) multigravida 35+, third trimester 2025    Grand multiparity 2025    Desires BTL, please place post-placental IUD 2025    ASCUS of cervix with negative high risk HPV 10/09/2023       P:   -Discussed normal s/sx pregnancy appropriate for gestational age general discomforts vs warning signs that necessitate evaluation in OB triage. Reviewed fetal movements appropriate for EGA. Reinforced adequate hydration and nutrition.  -Discussed labor precautions, including 5-1-1 rule. Instructed to report to hospital with leaking of fluid, concerns for decreased FM, excessive vaginal bleeding.   -Discussed signs and symptoms of preeclampsia, including HA that is not resolved by rest/Tylenol/hydration, changes in vision, RUQ pain, or sudden increase in swelling.   -Disc GBS antibiotics in labor  -IOL request sent for 39 weeks d/t AMA, discussed process w/ pt        Renuka Humphrey C.N.M.

## 2025-06-03 NOTE — PROGRESS NOTES
Pt here today for OBFV   +FM movemnet  No VB, LOF. 's   Phone number [phone] (home)   Pharmacy verified   GBS- pos

## 2025-06-04 ENCOUNTER — TELEPHONE (OUTPATIENT)
Dept: OBGYN | Facility: CLINIC | Age: 35
End: 2025-06-04
Payer: MEDICAID

## 2025-06-04 NOTE — TELEPHONE ENCOUNTER
Phone Number Called: [phone]    Call outcome: Left detailed message for patient. Informed to call back with any additional questions.    Message: Called pt to get clarification on her [place] paperwork. 2 sets of paperwork were received so I wanted to clarify which set needs to be completed. Provided office phone number as well as Danika and I's name when she calls back. Call ended.     2:14p : Spoke with pt and clarified I will be filling out the first set of paperwork we received. She also would like hard copies after it is finished and faxed so I told her once completed I would let her know and leave the paperwork at the front for her to  at her earliest convenience. Pt agreed and call ended.

## 2025-06-05 ENCOUNTER — ANESTHESIA EVENT (OUTPATIENT)
Dept: ANESTHESIOLOGY | Facility: MEDICAL CENTER | Age: 35
End: 2025-06-05
Payer: MEDICAID

## 2025-06-05 ENCOUNTER — HOSPITAL ENCOUNTER (INPATIENT)
Facility: MEDICAL CENTER | Age: 35
LOS: 1 days | End: 2025-06-06
Attending: STUDENT IN AN ORGANIZED HEALTH CARE EDUCATION/TRAINING PROGRAM | Admitting: STUDENT IN AN ORGANIZED HEALTH CARE EDUCATION/TRAINING PROGRAM
Payer: MEDICAID

## 2025-06-05 ENCOUNTER — ANESTHESIA (OUTPATIENT)
Dept: ANESTHESIOLOGY | Facility: MEDICAL CENTER | Age: 35
End: 2025-06-05
Payer: MEDICAID

## 2025-06-05 ENCOUNTER — APPOINTMENT (OUTPATIENT)
Dept: OBGYN | Facility: MEDICAL CENTER | Age: 35
End: 2025-06-05
Attending: STUDENT IN AN ORGANIZED HEALTH CARE EDUCATION/TRAINING PROGRAM
Payer: MEDICAID

## 2025-06-05 PROBLEM — Z34.90 ENCOUNTER FOR INDUCTION OF LABOR: Status: ACTIVE | Noted: 2025-06-05

## 2025-06-05 LAB
ABO GROUP BLD: NORMAL
ALBUMIN SERPL BCP-MCNC: 3.3 G/DL (ref 3.2–4.9)
ALBUMIN/GLOB SERPL: 0.9 G/DL
ALP SERPL-CCNC: 148 U/L (ref 30–99)
ALT SERPL-CCNC: 5 U/L (ref 2–50)
ANION GAP SERPL CALC-SCNC: 12 MMOL/L (ref 7–16)
AST SERPL-CCNC: 17 U/L (ref 12–45)
BARCODED ABORH UBTYP: 6200
BARCODED PRD CODE UBPRD: NORMAL
BARCODED UNIT NUM UBUNT: NORMAL
BASOPHILS # BLD AUTO: 0.6 % (ref 0–1.8)
BASOPHILS # BLD: 0.08 K/UL (ref 0–0.12)
BILIRUB SERPL-MCNC: 0.5 MG/DL (ref 0.1–1.5)
BLD GP AB SCN SERPL QL: NORMAL
BUN SERPL-MCNC: 14 MG/DL (ref 8–22)
CALCIUM ALBUM COR SERPL-MCNC: 9.3 MG/DL (ref 8.5–10.5)
CALCIUM SERPL-MCNC: 8.7 MG/DL (ref 8.5–10.5)
CHLORIDE SERPL-SCNC: 107 MMOL/L (ref 96–112)
CO2 SERPL-SCNC: 18 MMOL/L (ref 20–33)
COMPONENT R 8504R: NORMAL
CREAT SERPL-MCNC: 0.71 MG/DL (ref 0.5–1.4)
EOSINOPHIL # BLD AUTO: 0.24 K/UL (ref 0–0.51)
EOSINOPHIL NFR BLD: 1.9 % (ref 0–6.9)
ERYTHROCYTE [DISTWIDTH] IN BLOOD BY AUTOMATED COUNT: 45.2 FL (ref 35.9–50)
GFR SERPLBLD CREATININE-BSD FMLA CKD-EPI: 112 ML/MIN/1.73 M 2
GLOBULIN SER CALC-MCNC: 3.5 G/DL (ref 1.9–3.5)
GLUCOSE SERPL-MCNC: 76 MG/DL (ref 65–99)
HCT VFR BLD AUTO: 27.3 % (ref 37–47)
HGB BLD-MCNC: 7.9 G/DL (ref 12–16)
IMM GRANULOCYTES # BLD AUTO: 0.63 K/UL (ref 0–0.11)
IMM GRANULOCYTES NFR BLD AUTO: 4.9 % (ref 0–0.9)
LYMPHOCYTES # BLD AUTO: 1.9 K/UL (ref 1–4.8)
LYMPHOCYTES NFR BLD: 14.7 % (ref 22–41)
MCH RBC QN AUTO: 20.5 PG (ref 27–33)
MCHC RBC AUTO-ENTMCNC: 28.9 G/DL (ref 32.2–35.5)
MCV RBC AUTO: 70.9 FL (ref 81.4–97.8)
MONOCYTES # BLD AUTO: 0.68 K/UL (ref 0–0.85)
MONOCYTES NFR BLD AUTO: 5.3 % (ref 0–13.4)
NEUTROPHILS # BLD AUTO: 9.41 K/UL (ref 1.82–7.42)
NEUTROPHILS NFR BLD: 72.6 % (ref 44–72)
NRBC # BLD AUTO: 0.24 K/UL
NRBC BLD-RTO: 1.9 /100 WBC (ref 0–0.2)
PLATELET # BLD AUTO: 288 K/UL (ref 164–446)
PMV BLD AUTO: 9.9 FL (ref 9–12.9)
POTASSIUM SERPL-SCNC: 3.5 MMOL/L (ref 3.6–5.5)
PRODUCT TYPE UPROD: NORMAL
PROT SERPL-MCNC: 6.8 G/DL (ref 6–8.2)
RBC # BLD AUTO: 3.85 M/UL (ref 4.2–5.4)
RH BLD: NORMAL
SODIUM SERPL-SCNC: 137 MMOL/L (ref 135–145)
T PALLIDUM AB SER QL IA: NORMAL
UNIT STATUS USTAT: NORMAL
WBC # BLD AUTO: 12.9 K/UL (ref 4.8–10.8)

## 2025-06-05 PROCEDURE — 86901 BLOOD TYPING SEROLOGIC RH(D): CPT

## 2025-06-05 PROCEDURE — 700111 HCHG RX REV CODE 636 W/ 250 OVERRIDE (IP)

## 2025-06-05 PROCEDURE — 10907ZC DRAINAGE OF AMNIOTIC FLUID, THERAPEUTIC FROM PRODUCTS OF CONCEPTION, VIA NATURAL OR ARTIFICIAL OPENING: ICD-10-PCS | Performed by: STUDENT IN AN ORGANIZED HEALTH CARE EDUCATION/TRAINING PROGRAM

## 2025-06-05 PROCEDURE — 304965 HCHG RECOVERY SERVICES

## 2025-06-05 PROCEDURE — 770002 HCHG ROOM/CARE - OB PRIVATE (112)

## 2025-06-05 PROCEDURE — 700102 HCHG RX REV CODE 250 W/ 637 OVERRIDE(OP): Performed by: STUDENT IN AN ORGANIZED HEALTH CARE EDUCATION/TRAINING PROGRAM

## 2025-06-05 PROCEDURE — 59409 OBSTETRICAL CARE: CPT

## 2025-06-05 PROCEDURE — 80053 COMPREHEN METABOLIC PANEL: CPT

## 2025-06-05 PROCEDURE — 700111 HCHG RX REV CODE 636 W/ 250 OVERRIDE (IP): Performed by: ANESTHESIOLOGY

## 2025-06-05 PROCEDURE — 700101 HCHG RX REV CODE 250: Performed by: STUDENT IN AN ORGANIZED HEALTH CARE EDUCATION/TRAINING PROGRAM

## 2025-06-05 PROCEDURE — 36415 COLL VENOUS BLD VENIPUNCTURE: CPT

## 2025-06-05 PROCEDURE — 82570 ASSAY OF URINE CREATININE: CPT

## 2025-06-05 PROCEDURE — 86850 RBC ANTIBODY SCREEN: CPT

## 2025-06-05 PROCEDURE — 86780 TREPONEMA PALLIDUM: CPT

## 2025-06-05 PROCEDURE — 700111 HCHG RX REV CODE 636 W/ 250 OVERRIDE (IP): Mod: JZ | Performed by: ANESTHESIOLOGY

## 2025-06-05 PROCEDURE — 700111 HCHG RX REV CODE 636 W/ 250 OVERRIDE (IP): Performed by: STUDENT IN AN ORGANIZED HEALTH CARE EDUCATION/TRAINING PROGRAM

## 2025-06-05 PROCEDURE — 700105 HCHG RX REV CODE 258: Performed by: STUDENT IN AN ORGANIZED HEALTH CARE EDUCATION/TRAINING PROGRAM

## 2025-06-05 PROCEDURE — 86900 BLOOD TYPING SEROLOGIC ABO: CPT

## 2025-06-05 PROCEDURE — 0UH97HZ INSERTION OF CONTRACEPTIVE DEVICE INTO UTERUS, VIA NATURAL OR ARTIFICIAL OPENING: ICD-10-PCS | Performed by: STUDENT IN AN ORGANIZED HEALTH CARE EDUCATION/TRAINING PROGRAM

## 2025-06-05 PROCEDURE — 59410 OBSTETRICAL CARE: CPT | Performed by: STUDENT IN AN ORGANIZED HEALTH CARE EDUCATION/TRAINING PROGRAM

## 2025-06-05 PROCEDURE — 303615 HCHG EPIDURAL/SPINAL ANESTHESIA FOR LABOR

## 2025-06-05 PROCEDURE — 85025 COMPLETE CBC W/AUTO DIFF WBC: CPT

## 2025-06-05 PROCEDURE — 58300 INSERT INTRAUTERINE DEVICE: CPT | Performed by: STUDENT IN AN ORGANIZED HEALTH CARE EDUCATION/TRAINING PROGRAM

## 2025-06-05 PROCEDURE — A9270 NON-COVERED ITEM OR SERVICE: HCPCS | Performed by: STUDENT IN AN ORGANIZED HEALTH CARE EDUCATION/TRAINING PROGRAM

## 2025-06-05 PROCEDURE — 84156 ASSAY OF PROTEIN URINE: CPT

## 2025-06-05 PROCEDURE — 700105 HCHG RX REV CODE 258: Performed by: ANESTHESIOLOGY

## 2025-06-05 RX ORDER — ROPIVACAINE HYDROCHLORIDE 2 MG/ML
INJECTION, SOLUTION EPIDURAL; INFILTRATION; PERINEURAL CONTINUOUS
Status: DISCONTINUED | OUTPATIENT
Start: 2025-06-05 | End: 2025-06-06 | Stop reason: HOSPADM

## 2025-06-05 RX ORDER — MISOPROSTOL 200 UG/1
800 TABLET ORAL
Status: DISCONTINUED | OUTPATIENT
Start: 2025-06-05 | End: 2025-06-05

## 2025-06-05 RX ORDER — ACETAMINOPHEN 500 MG
1000 TABLET ORAL EVERY 6 HOURS PRN
Status: DISCONTINUED | OUTPATIENT
Start: 2025-06-05 | End: 2025-06-06 | Stop reason: HOSPADM

## 2025-06-05 RX ORDER — DOCUSATE SODIUM 100 MG/1
100 CAPSULE, LIQUID FILLED ORAL 2 TIMES DAILY PRN
Status: DISCONTINUED | OUTPATIENT
Start: 2025-06-05 | End: 2025-06-06 | Stop reason: HOSPADM

## 2025-06-05 RX ORDER — SODIUM CHLORIDE, SODIUM LACTATE, POTASSIUM CHLORIDE, AND CALCIUM CHLORIDE .6; .31; .03; .02 G/100ML; G/100ML; G/100ML; G/100ML
1000 INJECTION, SOLUTION INTRAVENOUS
Status: COMPLETED | OUTPATIENT
Start: 2025-06-05 | End: 2025-06-06

## 2025-06-05 RX ORDER — BUPIVACAINE HYDROCHLORIDE 2.5 MG/ML
INJECTION, SOLUTION EPIDURAL; INFILTRATION; INTRACAUDAL; PERINEURAL
Status: COMPLETED
Start: 2025-06-05 | End: 2025-06-05

## 2025-06-05 RX ORDER — SODIUM CHLORIDE, SODIUM LACTATE, POTASSIUM CHLORIDE, CALCIUM CHLORIDE 600; 310; 30; 20 MG/100ML; MG/100ML; MG/100ML; MG/100ML
INJECTION, SOLUTION INTRAVENOUS CONTINUOUS
Status: DISCONTINUED | OUTPATIENT
Start: 2025-06-05 | End: 2025-06-05

## 2025-06-05 RX ORDER — MISOPROSTOL 200 UG/1
1000 TABLET ORAL ONCE
Status: COMPLETED | OUTPATIENT
Start: 2025-06-05 | End: 2025-06-05

## 2025-06-05 RX ORDER — SODIUM CHLORIDE, SODIUM LACTATE, POTASSIUM CHLORIDE, CALCIUM CHLORIDE 600; 310; 30; 20 MG/100ML; MG/100ML; MG/100ML; MG/100ML
2000 INJECTION, SOLUTION INTRAVENOUS PRN
Status: DISCONTINUED | OUTPATIENT
Start: 2025-06-05 | End: 2025-06-06 | Stop reason: HOSPADM

## 2025-06-05 RX ORDER — TRANEXAMIC ACID 100 MG/ML
1000 INJECTION, SOLUTION INTRAVENOUS ONCE
Status: COMPLETED | OUTPATIENT
Start: 2025-06-05 | End: 2025-06-06

## 2025-06-05 RX ORDER — EPHEDRINE SULFATE 50 MG/ML
5 INJECTION, SOLUTION INTRAVENOUS
Status: DISCONTINUED | OUTPATIENT
Start: 2025-06-05 | End: 2025-06-05 | Stop reason: HOSPADM

## 2025-06-05 RX ORDER — METHYLERGONOVINE MALEATE 0.2 MG/ML
0.2 INJECTION INTRAVENOUS
Status: DISCONTINUED | OUTPATIENT
Start: 2025-06-05 | End: 2025-06-05 | Stop reason: HOSPADM

## 2025-06-05 RX ORDER — SODIUM CHLORIDE, SODIUM LACTATE, POTASSIUM CHLORIDE, AND CALCIUM CHLORIDE .6; .31; .03; .02 G/100ML; G/100ML; G/100ML; G/100ML
250 INJECTION, SOLUTION INTRAVENOUS PRN
Status: DISCONTINUED | OUTPATIENT
Start: 2025-06-05 | End: 2025-06-05 | Stop reason: HOSPADM

## 2025-06-05 RX ORDER — BUPIVACAINE HYDROCHLORIDE 2.5 MG/ML
INJECTION, SOLUTION EPIDURAL; INFILTRATION; INTRACAUDAL; PERINEURAL
Status: COMPLETED | OUTPATIENT
Start: 2025-06-05 | End: 2025-06-05

## 2025-06-05 RX ORDER — FERROUS SULFATE 325(65) MG
325 TABLET ORAL DAILY
Status: DISCONTINUED | OUTPATIENT
Start: 2025-06-06 | End: 2025-06-06 | Stop reason: HOSPADM

## 2025-06-05 RX ORDER — OXYTOCIN 10 [USP'U]/ML
10 INJECTION, SOLUTION INTRAMUSCULAR; INTRAVENOUS
Status: DISCONTINUED | OUTPATIENT
Start: 2025-06-05 | End: 2025-06-05 | Stop reason: HOSPADM

## 2025-06-05 RX ORDER — IBUPROFEN 800 MG/1
800 TABLET, FILM COATED ORAL EVERY 8 HOURS PRN
Status: DISCONTINUED | OUTPATIENT
Start: 2025-06-05 | End: 2025-06-06 | Stop reason: HOSPADM

## 2025-06-05 RX ORDER — LIDOCAINE HYDROCHLORIDE 10 MG/ML
20 INJECTION, SOLUTION INFILTRATION; PERINEURAL
Status: DISCONTINUED | OUTPATIENT
Start: 2025-06-05 | End: 2025-06-05 | Stop reason: HOSPADM

## 2025-06-05 RX ORDER — TERBUTALINE SULFATE 1 MG/ML
0.25 INJECTION SUBCUTANEOUS
Status: DISCONTINUED | OUTPATIENT
Start: 2025-06-05 | End: 2025-06-05 | Stop reason: HOSPADM

## 2025-06-05 RX ORDER — ACETAMINOPHEN 500 MG
1000 TABLET ORAL
Status: COMPLETED | OUTPATIENT
Start: 2025-06-05 | End: 2025-06-05

## 2025-06-05 RX ORDER — IBUPROFEN 800 MG/1
800 TABLET, FILM COATED ORAL
Status: DISCONTINUED | OUTPATIENT
Start: 2025-06-05 | End: 2025-06-05 | Stop reason: HOSPADM

## 2025-06-05 RX ORDER — CARBOPROST TROMETHAMINE 250 UG/ML
250 INJECTION, SOLUTION INTRAMUSCULAR
Status: DISCONTINUED | OUTPATIENT
Start: 2025-06-05 | End: 2025-06-05 | Stop reason: HOSPADM

## 2025-06-05 RX ADMIN — SODIUM CHLORIDE, POTASSIUM CHLORIDE, SODIUM LACTATE AND CALCIUM CHLORIDE 1000 ML: 600; 310; 30; 20 INJECTION, SOLUTION INTRAVENOUS at 13:54

## 2025-06-05 RX ADMIN — WATER 2.5 MILLION UNITS: 1 INJECTION INTRAMUSCULAR; INTRAVENOUS; SUBCUTANEOUS at 14:58

## 2025-06-05 RX ADMIN — SODIUM CHLORIDE, POTASSIUM CHLORIDE, SODIUM LACTATE AND CALCIUM CHLORIDE: 600; 310; 30; 20 INJECTION, SOLUTION INTRAVENOUS at 11:16

## 2025-06-05 RX ADMIN — BUPIVACAINE HYDROCHLORIDE 4 ML: 2.5 INJECTION, SOLUTION EPIDURAL; INFILTRATION; INTRACAUDAL at 14:14

## 2025-06-05 RX ADMIN — TRANEXAMIC ACID 1000 MG: 100 INJECTION INTRAVENOUS at 17:49

## 2025-06-05 RX ADMIN — FENTANYL CITRATE 100 MCG: 50 INJECTION, SOLUTION INTRAMUSCULAR; INTRAVENOUS at 14:14

## 2025-06-05 RX ADMIN — SODIUM CHLORIDE 5 MILLION UNITS: 900 INJECTION INTRAVENOUS at 11:18

## 2025-06-05 RX ADMIN — LEVONORGESTREL 1 EACH: 52 INTRAUTERINE DEVICE INTRAUTERINE at 17:56

## 2025-06-05 RX ADMIN — MISOPROSTOL 1000 MCG: 200 TABLET ORAL at 17:48

## 2025-06-05 RX ADMIN — OXYTOCIN 125 ML/HR: 10 INJECTION, SOLUTION INTRAMUSCULAR; INTRAVENOUS at 19:53

## 2025-06-05 RX ADMIN — ROPIVACAINE HYDROCHLORIDE: 2 INJECTION, SOLUTION EPIDURAL; INFILTRATION; PERINEURAL at 14:18

## 2025-06-05 RX ADMIN — ACETAMINOPHEN 1000 MG: 500 TABLET ORAL at 19:11

## 2025-06-05 RX ADMIN — OXYTOCIN 20 UNITS: 10 INJECTION, SOLUTION INTRAMUSCULAR; INTRAVENOUS at 17:50

## 2025-06-05 RX ADMIN — SODIUM CHLORIDE, POTASSIUM CHLORIDE, SODIUM LACTATE AND CALCIUM CHLORIDE: 600; 310; 30; 20 INJECTION, SOLUTION INTRAVENOUS at 14:57

## 2025-06-05 ASSESSMENT — PAIN SCALES - GENERAL: PAINLEVEL: 0 - NO PAIN

## 2025-06-05 ASSESSMENT — PAIN DESCRIPTION - PAIN TYPE: TYPE: ACUTE PAIN

## 2025-06-05 ASSESSMENT — FIBROSIS 4 INDEX: FIB4 SCORE: 0.92

## 2025-06-05 NOTE — ANESTHESIA PROCEDURE NOTES
Epidural Block    Date/Time: 6/5/2025 2:14 PM    Performed by: Maria Eugenia Macedo M.D.  Authorized by: Maria Eugenia Macedo M.D.    Patient Location:  OB  Start Time:  6/5/2025 2:14 PM  End Time:  6/5/2025 2:16 PM  Reason for Block: labor analgesia    patient identified, IV checked, site marked, risks and benefits discussed, surgical consent, monitors and equipment checked and pre-op evaluation    Patient Position:  Sitting  Prep: ChloraPrep, patient draped and sterile technique    Monitoring:  Blood pressure, continuous pulse oximetry and heart rate  Approach:  Midline  Location:  L3-L4  Injection Technique:  TANYA air and TANYA saline  Needle Type:  Tuohy  Needle Gauge:  17 G  Needle Length:  3.5 in  Loss of resistance::  5  Catheter Size:  19 G  Catheter at Skin Depth:  11  Test Dose Result:  Negative

## 2025-06-05 NOTE — ANESTHESIA PREPROCEDURE EVALUATION
Date: 25  Procedure: Labor Epidural         35yo  at 39 weeks, here about to be induced.  History of PPH and very rapid labor and wants epidural for pain control prior to starting the induction      Relevant Problems   Other   (positive) AMA (advanced maternal age) multigravida 35+, third trimester   (positive) ASCUS of cervix with negative high risk HPV   (positive) Antepartum anemia   (positive) Desires BTL, PT STRONGLY DESIRES post-placental IUD!!!   (positive) Grand multiparity       Physical Exam    Airway   Mallampati: II  TM distance: >3 FB  Neck ROM: full       Cardiovascular - normal exam  Rhythm: regular  Rate: normal    (-) murmur     Dental - normal exam           Pulmonary - normal examBreath sounds clear to auscultation     Abdominal    Neurological - normal exam                   Anesthesia Plan    ASA 2       Plan - epidural   Neuraxial block will be labor analgesia                  Pertinent diagnostic labs and testing reviewed    Informed Consent:    Anesthetic plan and risks discussed with patient.

## 2025-06-05 NOTE — PROGRESS NOTES
EDC 2025 GA39.0    Patient presents to L&D for scheduled IOL. EFM monitors applied and tracing. VSS. Patient denies contractions, LOF, VB, states normal FM. Birth wishes gone over at length. Questions answered. Oriented to room, call light at bedside, educated on use.     1047: SVE 4/70/-2.     1050: Report to Dr. Lugo. Orders received.     1115: Dr. Arnold to bedside. Plan for epidural in 1400 hour.    1150: Dr. Parra updated on starting hgb value.    1410: Dr. Macedo to bedside for epidural placement.     1510: Dr. Arnodl and Parish at bedside, AROM preformed with clear fluid noted. SVE 5/70/-2.     1535: Patient reports increased pressure. SVE 7/80/-2. Dr. Lugo updated.    1734: SVE C/+1. Dr. Lugo updated. MD to bedside with Clayton BENAVIDES.     1746:  viable infant male. APGARs 7/9.     : Dr. Parra updated on tachycardia, manual BP values, patient shaking, status. MD to bedside. Fundal rub done, firm 1-u. Patiet states she is starting to feel better.     : Dr. Sharp updated on patient temp. Orders for 1g Tylenol.     : Patient ambulated to restroom RN SBA, voided adequately. Keyana care done, new pad and undies given, new gown given.     : Dr. Arnold updated on patient status, VS, temp, bleeding, OK to tx patient to PP unit.     2100: Patient transferred to PP unit via wheelchair. Infant in arms, FOB at side. Report to Yara THOMAS. Bands verified. Care relinquished.

## 2025-06-05 NOTE — PROGRESS NOTES
Labor Progress Note:      S:  Pt reports she is feeling comfortable after her epidural.     Patient Vitals for the past 4 hrs:   BP Temp Temp src Pulse Resp SpO2   25 1210 (!) 142/94 36.7 °C (98.1 °F) Temporal 85 16 96 %   25 1120 127/84 -- -- 71 -- --     Gen: AAO, NAD    SVE: 5/70/-2    FHT: 135/moderate variability/+accels/-decels      A/P: 34 y.o.  @ 39w0d by LMP admitted for IOL for AMA.  - labor: AROM with clear fluid  - FHT: cat I  - GBS: pos, 2nd bag PCN running  - pain: controlled with epidural     Maira Lugo D.O.   PGY-1  [facility]

## 2025-06-05 NOTE — H&P
OB H&P:    CC: IOL    HPI:  Ms. Karlene Arriaza is a 34 y.o.  @ 39w0d by LMP for IOL for AMA. Patient has had anemia this pregnancy and has been receiving iron infusions but has not had any other complications. She has history of pre-eclampsia with her last pregnancy but has had normal blood pressures this pregnancy. She also had hemorrhaging with her last delivery and required blood transfusions. This is the only delivery she has required transfusions with. All of her deliveries have been vaginal. She denies any abdominal surgeries.     Contractions: Yes , irregular   Loss of fluid: No   Vaginal bleeding: No   Fetal movement: present      P[facility]atal Care with:  [facility]    Complications this pregnancy:  Advanced Maternal Age  History of pre-eclampsia with prior pregnancy  History of hemorrhage with last vaginal delivery   Anemia  GBS +    Prenatal Labs:  Rh+, Rubella Immune, HIV neg, TrepAb neg, HBsAg NR, Hep C Ab NR, GC/CT neg/neg  Glucola: 1 hr normal   GBS +      ROS:  Const: denies fevers, general concerns  CV/resp: reports no concerns  GI: denies abd pain, GI concerns  : see HPI  Neuro: denies HA/vision changes    OB History    Para Term  AB Living   8 7 6 1  7   SAB IAB Ectopic Molar Multiple Live Births       0 7      # Outcome Date GA Lbr Driss/2nd Weight Sex Type Anes PTL Lv   8 Current            7  23 35w1d / 00:05 2.12 kg (4 lb 10.8 oz) M Vag-Spont EPI Y SANDHYA   6 Term  40w0d  2.268 kg (5 lb) F Vag-Spont   SANDHYA   5 Term    2.722 kg (6 lb)     SANDHYA   4 Term  40w0d  2.722 kg (6 lb) F Vag-Spont   SANDHYA   3 Term  40w0d  2.268 kg (5 lb) F Vag-Spont   SANDHYA   2 Term  40w0d  2.268 kg (5 lb) F Vag-Spont   SANDHYA   1 Term  40w0d  2.722 kg (6 lb) M Vag-Spont   SANDHYA       GYN: denies STIs, no cervical procedures    Past Medical History[1]    Past Surgical History[2]    Medications Ordered Prior to Encounter[3]    Family History   Problem Relation Age of Onset    No Known  "Problems Mother     Diabetes Father        Social History[4]      PE:   Vitals:    25 0958 25 1010   BP:  (!) 142/94   Pulse:  85   Temp:  36.7 °C (98.1 °F)   SpO2:  96%   Weight: 87.5 kg (193 lb)    Height: 1.575 m (5' 2\")      gen: AAO, NAD  abd: soft, gravid, NT  Ext: NT, mild edema    SVE: 4/70/-2 @ 1047  FHT: 150/moderate variability/+ accels/ - decels      A/P: 34 y.o.  @ 39w0d by LMP for IOL for AMA.  -Admit to Labor and Delivery  -Fetal wellbeing: Cat I  -Continuous external fetal monitoring and tocometer  -Will plan for likely AROM and pitocin after administration of PCN  -Patient wishes to have epidural for pain management   -Plan for normal vaginal delivery  -Contraception options discussed, patient plans postplacental IUD    #GBS +  - Start PCN G     #Hx of pre-eclampsia with previous pregnancy  Patient had one elevated pressure at time of admission at 142/94, repeat pressure was improved. Patient has had normal pressures this pregnancy.  - PIH labs ordered    #History of hemorrhage requiring blood transfusions  #Anemia  - Will have 2 units pRBCs on standby for delivery   - May require IV iron while inpatient   - Monitor CBC    Maira Lugo DO  PGY-1 Family Medicine Resident  [facility]          [1]   Past Medical History:  Diagnosis Date    Gestational hypertension, third trimester 2023   [2] No past surgical history on file.  [3]   No current facility-administered medications on file prior to encounter.     Current Outpatient Medications on File Prior to Encounter   Medication Sig Dispense Refill    ferrous sulfate 325 (65 Fe) MG tablet Take 325 mg by mouth every day.      Prenatal MV-Min-Fe Fum-FA-DHA (PRENATAL 1 PO) Take  by mouth.     [4]   Social History  Tobacco Use    Smoking status: Never   Vaping Use    Vaping status: Never Used   Substance Use Topics    Alcohol use: Not Currently    Drug use: Never     "

## 2025-06-06 ENCOUNTER — TELEPHONE (OUTPATIENT)
Dept: OBGYN | Facility: CLINIC | Age: 35
End: 2025-06-06
Payer: MEDICAID

## 2025-06-06 ENCOUNTER — PHARMACY VISIT (OUTPATIENT)
Dept: PHARMACY | Facility: MEDICAL CENTER | Age: 35
End: 2025-06-06
Payer: COMMERCIAL

## 2025-06-06 VITALS
WEIGHT: 193 LBS | DIASTOLIC BLOOD PRESSURE: 71 MMHG | HEIGHT: 62 IN | HEART RATE: 79 BPM | RESPIRATION RATE: 18 BRPM | TEMPERATURE: 98 F | BODY MASS INDEX: 35.51 KG/M2 | OXYGEN SATURATION: 97 % | SYSTOLIC BLOOD PRESSURE: 113 MMHG

## 2025-06-06 LAB
CREAT UR-MCNC: 171 MG/DL
ERYTHROCYTE [DISTWIDTH] IN BLOOD BY AUTOMATED COUNT: 44.7 FL (ref 35.9–50)
ERYTHROCYTE [DISTWIDTH] IN BLOOD BY AUTOMATED COUNT: 53.5 FL (ref 35.9–50)
HCT VFR BLD AUTO: 21.1 % (ref 37–47)
HCT VFR BLD AUTO: 24.8 % (ref 37–47)
HGB BLD-MCNC: 6.3 G/DL (ref 12–16)
HGB BLD-MCNC: 7.4 G/DL (ref 12–16)
MCH RBC QN AUTO: 21 PG (ref 27–33)
MCH RBC QN AUTO: 22.4 PG (ref 27–33)
MCHC RBC AUTO-ENTMCNC: 29.8 G/DL (ref 32.2–35.5)
MCHC RBC AUTO-ENTMCNC: 29.9 G/DL (ref 32.2–35.5)
MCV RBC AUTO: 70.3 FL (ref 81.4–97.8)
MCV RBC AUTO: 74.9 FL (ref 81.4–97.8)
PLATELET # BLD AUTO: 242 K/UL (ref 164–446)
PLATELET # BLD AUTO: 259 K/UL (ref 164–446)
PMV BLD AUTO: 9.7 FL (ref 9–12.9)
PMV BLD AUTO: 9.8 FL (ref 9–12.9)
PROT UR-MCNC: 31.4 MG/DL (ref 0–15)
PROT/CREAT UR: 184 MG/G (ref 10–107)
RBC # BLD AUTO: 3 M/UL (ref 4.2–5.4)
RBC # BLD AUTO: 3.31 M/UL (ref 4.2–5.4)
WBC # BLD AUTO: 17.8 K/UL (ref 4.8–10.8)
WBC # BLD AUTO: 18.7 K/UL (ref 4.8–10.8)

## 2025-06-06 PROCEDURE — P9016 RBC LEUKOCYTES REDUCED: HCPCS

## 2025-06-06 PROCEDURE — 86923 COMPATIBILITY TEST ELECTRIC: CPT

## 2025-06-06 PROCEDURE — 700102 HCHG RX REV CODE 250 W/ 637 OVERRIDE(OP): Performed by: STUDENT IN AN ORGANIZED HEALTH CARE EDUCATION/TRAINING PROGRAM

## 2025-06-06 PROCEDURE — A9270 NON-COVERED ITEM OR SERVICE: HCPCS | Performed by: STUDENT IN AN ORGANIZED HEALTH CARE EDUCATION/TRAINING PROGRAM

## 2025-06-06 PROCEDURE — 36430 TRANSFUSION BLD/BLD COMPNT: CPT

## 2025-06-06 PROCEDURE — RXMED WILLOW AMBULATORY MEDICATION CHARGE

## 2025-06-06 PROCEDURE — 85027 COMPLETE CBC AUTOMATED: CPT

## 2025-06-06 PROCEDURE — 36415 COLL VENOUS BLD VENIPUNCTURE: CPT

## 2025-06-06 RX ORDER — PSEUDOEPHEDRINE HCL 30 MG
100 TABLET ORAL 2 TIMES DAILY PRN
Qty: 60 CAPSULE | Refills: 2 | Status: SHIPPED | OUTPATIENT
Start: 2025-06-06

## 2025-06-06 RX ORDER — ASCORBIC ACID 500 MG
500 TABLET ORAL DAILY
Qty: 90 TABLET | Refills: 2 | Status: SHIPPED | OUTPATIENT
Start: 2025-06-06

## 2025-06-06 RX ORDER — FERROUS SULFATE 325(65) MG
325 TABLET ORAL DAILY
Qty: 90 TABLET | Refills: 2 | Status: SHIPPED | OUTPATIENT
Start: 2025-06-06 | End: 2025-06-06

## 2025-06-06 RX ORDER — FERROUS SULFATE 325(65) MG
325 TABLET ORAL DAILY
Qty: 90 TABLET | Refills: 3 | Status: SHIPPED | OUTPATIENT
Start: 2025-06-06

## 2025-06-06 RX ORDER — ACETAMINOPHEN 500 MG
1000 TABLET ORAL EVERY 6 HOURS PRN
Qty: 90 TABLET | Refills: 2 | Status: SHIPPED | OUTPATIENT
Start: 2025-06-06

## 2025-06-06 RX ORDER — IBUPROFEN 800 MG/1
800 TABLET, FILM COATED ORAL EVERY 8 HOURS PRN
Qty: 90 TABLET | Refills: 2 | Status: SHIPPED | OUTPATIENT
Start: 2025-06-06

## 2025-06-06 RX ADMIN — IBUPROFEN 800 MG: 800 TABLET, FILM COATED ORAL at 02:25

## 2025-06-06 SDOH — ECONOMIC STABILITY: TRANSPORTATION INSECURITY
IN THE PAST 12 MONTHS, HAS THE LACK OF TRANSPORTATION KEPT YOU FROM MEDICAL APPOINTMENTS OR FROM GETTING MEDICATIONS?: NO

## 2025-06-06 SDOH — ECONOMIC STABILITY: TRANSPORTATION INSECURITY
IN THE PAST 12 MONTHS, HAS LACK OF RELIABLE TRANSPORTATION KEPT YOU FROM MEDICAL APPOINTMENTS, MEETINGS, WORK OR FROM GETTING THINGS NEEDED FOR DAILY LIVING?: NO

## 2025-06-06 ASSESSMENT — SOCIAL DETERMINANTS OF HEALTH (SDOH)
WITHIN THE PAST 12 MONTHS, THE FOOD YOU BOUGHT JUST DIDN'T LAST AND YOU DIDN'T HAVE MONEY TO GET MORE: NEVER TRUE
WITHIN THE LAST YEAR, HAVE YOU BEEN AFRAID OF YOUR PARTNER OR EX-PARTNER?: NO
IN THE PAST 12 MONTHS, HAS THE ELECTRIC, GAS, OIL, OR WATER COMPANY THREATENED TO SHUT OFF SERVICE IN YOUR HOME?: NO
WITHIN THE PAST 12 MONTHS, YOU WORRIED THAT YOUR FOOD WOULD RUN OUT BEFORE YOU GOT THE MONEY TO BUY MORE: NEVER TRUE
WITHIN THE LAST YEAR, HAVE TO BEEN RAPED OR FORCED TO HAVE ANY KIND OF SEXUAL ACTIVITY BY YOUR PARTNER OR EX-PARTNER?: NO
WITHIN THE LAST YEAR, HAVE YOU BEEN HUMILIATED OR EMOTIONALLY ABUSED IN OTHER WAYS BY YOUR PARTNER OR EX-PARTNER?: NO
WITHIN THE LAST YEAR, HAVE YOU BEEN KICKED, HIT, SLAPPED, OR OTHERWISE PHYSICALLY HURT BY YOUR PARTNER OR EX-PARTNER?: NO

## 2025-06-06 ASSESSMENT — EDINBURGH POSTNATAL DEPRESSION SCALE (EPDS)
THE THOUGHT OF HARMING MYSELF HAS OCCURRED TO ME: NEVER
I HAVE BEEN ABLE TO LAUGH AND SEE THE FUNNY SIDE OF THINGS: AS MUCH AS I ALWAYS COULD
I HAVE BEEN SO UNHAPPY THAT I HAVE HAD DIFFICULTY SLEEPING: NOT AT ALL
I HAVE LOOKED FORWARD WITH ENJOYMENT TO THINGS: AS MUCH AS I EVER DID
I HAVE BEEN SO UNHAPPY THAT I HAVE BEEN CRYING: NO, NEVER
THINGS HAVE BEEN GETTING ON TOP OF ME: NO, I HAVE BEEN COPING AS WELL AS EVER
I HAVE BEEN ANXIOUS OR WORRIED FOR NO GOOD REASON: NO, NOT AT ALL
I HAVE FELT SAD OR MISERABLE: NO, NOT AT ALL
I HAVE BLAMED MYSELF UNNECESSARILY WHEN THINGS WENT WRONG: NO, NEVER
I HAVE FELT SCARED OR PANICKY FOR NO GOOD REASON: NO, NOT AT ALL

## 2025-06-06 NOTE — PROGRESS NOTES
Assumed care from Labor and Delivery. Infant identification bands verified. Oriented parents to call light, emergency light, feedings, safe sleep, bulb suction. Plan of care discussed. Assessment completed. Infant bundled and at bedside in open crib. FOB at bedside assisting with care.Assessment completed. Plan of care reviewed with parent. Infant bundled in open crib with parent. Reinforced feedings every 2-3hrs and safe sleep education, keeping infant bundled in sleep sack with hat in place when in open crib, encouraged holding skin to skin often for thermoregulation, bonding, and feedings.

## 2025-06-06 NOTE — TELEPHONE ENCOUNTER
Called pt and informed her that her [facility] paperwork has been completed, and faxed to number listed on the  paperwork. Originals scanned into media and placed at the . Pt informed she can  originals at her earliest convenience. GOLDY.

## 2025-06-06 NOTE — CARE PLAN
The patient is Stable - Low risk of patient condition declining or worsening    Shift Goals  Clinical Goals: vitals breastfeeding  Patient Goals: rest, Breastfeeding  Family Goals: Support and bonding    Progress made toward(s) clinical / shift goals:    Problem: Knowledge Deficit - L&D  Goal: Patient and family/caregivers will demonstrate understanding of plan of care, disease process/condition, diagnostic tests and medications  Outcome: Progressing     Problem: Risk for Excess Fluid Volume  Goal: Patient will demonstrate pulse, blood pressure and neurologic signs within expected ranges and without any respiratory complications  Outcome: Progressing     Problem: Knowledge Deficit - Standard  Goal: Patient and family/care givers will demonstrate understanding of plan of care, disease process/condition, diagnostic tests and medications  Outcome: Progressing     Problem: Pain - Standard  Goal: Alleviation of pain or a reduction in pain to the patient’s comfort goal  Outcome: Progressing       Patient is not progressing towards the following goals:

## 2025-06-06 NOTE — DISCHARGE SUMMARY
[facility]  Obstetrics Discharge Summary    Date of Admission: 2025  Date of Discharge: 25    Admitting diagnosis:    1. Pregnancy at 39w0d  2. Advanced maternal age  3. Anemia  4. History of pre-eclampsia in prior pregnancy  5. Group B Strep positive  6. Grand multiparity     Discharge Diagnosis:   1. Status post vaginal, spontaneous.  2. Advanced maternal age  3. Anemia  4. History of pre-eclampsia in prior pregnancy  5. Group B Strep positive  6. Grand mulitparity   7. Status post Liletta IUD placement    Hospital Course:   Pt is 34 y.o. now  who presented on 2025 for IOL for AMA.   Labor induction performed with AROM after initiation of second dose of penicillin for GBS positive.   Epidural anesthesia was utilized with good effect on pain.   Due to patient's history of anemia and post partum hemorrhage as well as her status of grand multiparity, 2 units of blood were order and put on standby for delivery and hemorrhage cart was outside of room.  Patient progressed to a spontaneous delivery. No lacerations were noted.   Liletta IUD was placed post-placental. Misoprostol 100mcg and TXA 1g IV were given following delivery prophylactically.    PIH labs were drawn due to patient's history of pre-eclampsia. Platelets and LFTs were WNL. Urine protein./creatinine ratio was 184. Patient's blood pressures were stable throughout admission.    Patient's pre-delivery Hgb level was 7.9. Her post-delivery hemoglobin level was 6.3. She received 1 unit of pRBCs and repeat CBC showed Hgb of 7.4. She was asymptomatic throughout her admission.    Postpartum course was unremarkable and patient has met all postpartum milestones.  Patient had early ambulation, well managed pain, tolerance of diet, spontaneous voiding, and appropriate feeding of infant.   She has remained afebrile and blood pressure has been well controlled.   All maternal questions and concerns addressed.    Single male infant was  delivered via spontaneous vaginal delivery on 25 at 1746 with APGARs 7 and 9 at 1 and 5 minutes respectively.    ml    PHYSICAL EXAM:  Temp:  [36.7 °C (98.1 °F)-39 °C (102.2 °F)] 37.1 °C (98.7 °F)  Pulse:  [] 95  Resp:  [16-19] 19  BP: ()/() 126/67  SpO2:  [89 %-100 %] 94 %    GEN: well appearing, no apparent distress  CV: +S1S2, RRR, mild BLE edema  RESP: CTAB, breathing comfortably on RA  ABD: soft, non-tender, non-distended, +BS  Fundus: firm below level of umbilicus  Perineum: Deferred  Extremities: symmetric, calves nontender    HISTORY:  Problem List[1]   Past Medical History[2]  OB History    Para Term  AB Living   8 8 7 1  8   SAB IAB Ectopic Molar Multiple Live Births       0 8      # Outcome Date GA Lbr Driss/2nd Weight Sex Type Anes PTL Lv   8 Term 25 39w0d / 00:12 3.085 kg (6 lb 12.8 oz) M Vag-Spont EPI N SANDHYA   7  23 35w1d / 00:05 2.12 kg (4 lb 10.8 oz) M Vag-Spont EPI Y SANDHYA   6 Term  40w0d  2.268 kg (5 lb) F Vag-Spont   SANDHYA   5 Term    2.722 kg (6 lb)     SANDHYA   4 Term  40w0d  2.722 kg (6 lb) F Vag-Spont   SANDHYA   3 Term  40w0d  2.268 kg (5 lb) F Vag-Spont   SANDHYA   2 Term  40w0d  2.268 kg (5 lb) F Vag-Spont   SANDHYA   1 Term  40w0d  2.722 kg (6 lb) M Vag-Spont   SANDHYA     Past Surgical History[3]  Allergies[4]   Current Facility-Administered Medications   Medication Dose    ropivacaine 0.2 % (Naropin) injection      ferrous sulfate tablet 325 mg  325 mg    lactated ringers infusion  2,000 mL    docusate sodium (Colace) capsule 100 mg  100 mg    ibuprofen (Motrin) tablet 800 mg  800 mg    acetaminophen (Tylenol) tablet 1,000 mg  1,000 mg    tetanus-dipth-acell pertussis (Tdap) inj 0.5 mL  0.5 mL    measles, mumps and rubella vaccine (Mmr) injection 0.5 mL  0.5 mL     Recent Labs     25  1020   WBC 12.9*   RBC 3.85*   HEMOGLOBIN 7.9*   HEMATOCRIT 27.3*   MCV 70.9*   MCH 20.5*   MCHC 28.9*   RDW 45.2   PLATELETCT 288   MPV 9.9        Discharge Meds:   No current outpatient medications on file.           Activity/ Discharge Instructions:  Discharge to home  Exercise and Activities as tolerated  Pelvic Rest x 6 weeks  No heavy lifting x4 weeks  Call or come to ED for: heavy vaginal bleeding, fever >100.4, severe abdominal pain, severe headache, chest pain, shortness of breath, significant nausea or vomiting, incisional drainage, or other concerns.  Contraception: IUD placed post-placental    Diet:  As tolerated. Additional 400 kcal per day to maintain milk supply. Drink plenty of fluids daily.  Continue prenatal vitamins for six months or as long as breastfeeding.    Anemia:  - Continue iron and vitamin C daily  - Follow up with PCP for repeat CBC and management of anemia     Follow up:     [facility] five weeks for vaginal delivery.      Maira Lugo DO  PGY-1 Family Medicine Resident  [facility]          [1]   Patient Active Problem List  Diagnosis    ASCUS of cervix with negative high risk HPV    AMA (advanced maternal age) multigravida 35+, third trimester    Grand multiparity    Desires BTL, PT STRONGLY DESIRES post-placental IUD!!!    Antepartum anemia    Encounter for induction of labor   [2]   Past Medical History:  Diagnosis Date    Gestational hypertension, third trimester 11/28/2023   [3] History reviewed. No pertinent surgical history.  [4] No Known Allergies

## 2025-06-06 NOTE — PROGRESS NOTES
0817 Dr Brown aware of H/H results, Patient reports that she is feeling well, denies dizziness or lightheadedness, he states they will probably order a transfusion but that Dr. Lugo will be placing orders.   0900 Per DR. Luog, given 1 unit of PRBC and then time repeat CBC in 2 hours from completion.   1000 Assessment completed. Lochia light, fundus firm. Plan of care reviewed. Declines pain intervention at this time, will call if pain intervention needed.   1021 Transfusion started. Patient VS WNL, patient tolerating well, no signs of a reaction.

## 2025-06-06 NOTE — L&D DELIVERY NOTE
[facility]  VAGINAL DELIVERY NOTE    Karlene Arriaza is a 34 y.o.  admitted for IOL in setting of AMA and anemia. On presentation, her SVE was 4/70/-2. She was GBS positive, so penicillin was administered. After the initiation of her second dose of penicillin, AROM was performed. She quickly progressed to complete dilation.     PreDelivery Diagnosis:  1. SIUP @ 39w0d  2. Advanced maternal age  3. Anemia  4. Hx of preeclampsia in prior pregnancy  5. GBS positive    PostDelivery Diagnosis:  1. S/p   2. S/p Postplacental Liletta IUD placement    Procedure in Detail:  Patient began pushing in the dorsal lithotomy position.  The head delivered easily over an intact perineum in the ROGER position.  The anterior shoulder followed easily with gentle downwards pressure followed by the posterior shoulder and body.  There were two nuchal cords present which were reduced after delivery. Time of birth was 1746. Infant was placed on the maternal abdomen and was stimulated and bulb suctioned.  Cord clamping was delayed x60 seconds then the cord was clamped x2 and cut.  Infant APGARs 7 and 9 and 1 and 5 minutes, respectively.  Placenta delivered spontaneously intact with 3 vessel cord.  The vagina and perineum were examined revealing no lacerations. The uterus was firm with IV pitocin and fundal massage. Misoprostol 1000mcg per rectum and TXA 1g IV were administered prophylactically given her grand multiparity and history of hemorrhage.    Post-placental IUD insertion: The patient desired a post-placental Liletta IUD (Lot [id number], Exp 2029) and was previously counseled on risks, benefits, and alternatives at which time consents were signed. The IUD was placed intrauterine to the fundus manually without difficulty. Care was taken to ensure the IUD remained in the fundal position following insertion. Strings were trimmed to the level of the external cervical os. The uterus was again confrimed to be firm with  fundal massage. Patient and infant left bonding in LDR.    EBL 100cc  Anesthesia - epidural  Sponge and needle counts correct.  Patient tolerated procedure well.    Anticipate routine postpartum care.    Lillian Love MD  Obstetrics and Gynecology  [facility]

## 2025-06-06 NOTE — CARE PLAN
The patient is Stable - Low risk of patient condition declining or worsening    Shift Goals  Clinical Goals: Patiet VSS; Lochia WDL; Pain WDL; H/H will improve after 1 unit of PRBC administered  Patient Goals: rest, Breastfeeding  Family Goals: Support and bonding    Progress made toward(s) clinical / shift goals:    Problem: Knowledge Deficit - L&D  Goal: Patient and family/caregivers will demonstrate understanding of plan of care, disease process/condition, diagnostic tests and medications  Outcome: Met     Problem: Risk for Excess Fluid Volume  Goal: Patient will demonstrate pulse, blood pressure and neurologic signs within expected ranges and without any respiratory complications  Outcome: Met     Problem: Knowledge Deficit - Standard  Goal: Patient and family/care givers will demonstrate understanding of plan of care, disease process/condition, diagnostic tests and medications  Outcome: Met     Problem: Pain - Standard  Goal: Alleviation of pain or a reduction in pain to the patient’s comfort goal  Outcome: Met

## 2025-06-06 NOTE — ANESTHESIA POSTPROCEDURE EVALUATION
Patient: Karlene Arriaza    Procedure Summary       Date: 06/05/25 Room / Location:     Anesthesia Start: 1407 Anesthesia Stop: 1746    Procedure: Labor Epidural Diagnosis:     Scheduled Providers:  Responsible Provider: Maria Eugenia Macedo M.D.    Anesthesia Type: epidural ASA Status: 2            Final Anesthesia Type: epidural  Last vitals  BP   Blood Pressure: 132/81    Temp   37.7 °C (99.9 °F)    Pulse   90   Resp   19    SpO2   97 %      Anesthesia Post Evaluation    Patient location during evaluation: floor  Patient participation: complete - patient participated  Level of consciousness: awake and alert    Airway patency: patent  Anesthetic complications: no  Cardiovascular status: hemodynamically stable  Respiratory status: acceptable  Hydration status: euvolemic    PONV: none          No notable events documented.     Nurse Pain Score: 0 (NPRS)

## 2025-06-06 NOTE — ANESTHESIA TIME REPORT
Anesthesia Start and Stop Event Times       Date Time Event    6/5/2025 1407 Ready for Procedure     1407 Anesthesia Start     1746 Anesthesia Stop          Responsible Staff  06/05/25      Name Role Begin End    Maria Eugenia Macedo M.D. Anesth 1407 1748          Overtime Reason:  no overtime (within assigned shift)    Comments:

## 2025-06-06 NOTE — DISCHARGE INSTRUCTIONS
PATIENT DISCHARGE EDUCATION INSTRUCTION SHEET    REASONS TO CALL YOUR OBSTETRICIAN  Persistent fever, shaking, chills (Temperature higher than 100.4) may indicate you have an infection  Heavy bleeding: soaking more than 1 pad per hour; Passing clots an egg-sized clot or bigger may mean you have an postpartum hemorrhage  Foul odor from vagina or bad smelling or discolored discharge or blood  Breast infection (Mastitis symptoms); breast pain, chills, fever, redness or red streaks, may feel flu like symptoms  Urinary pain, burning or frequency  Incision that is not healing, increased redness, swelling, tenderness or pain, or any pus from episiotomy or  site may mean you have an infection  Redness, swelling, warmth, or painful to touch in the calf area of your leg may mean you have a blood clot  Severe or intensified depression, thoughts or feelings of wanting to hurt yourself or someone else   Pain in chest, obstructed breathing or shortness of breath (trouble catching your breath) may mean you are having a postpartum complication. Call your provider immediately   Headache that does not get better, even after taking medicine, a bad headache with vision changes or pain in the upper right area of your belly may mean you have high blood pressure or post birth preeclampsia. Call your provider immediately    HAND WASHING  All family and friends should wash their hands:  Before and after holding the baby  Before feeding the baby  After using the restroom or changing the baby's diaper    WOUND CARE  Ask your physician for additional care instructions. In general:   Incision:  May shower and pat incision dry   Keep the incision clean and dry  There should not be any opening or pus from the incision  Continue to walk at home 3 times a day   Do NOT lift anything heavier than your baby (over 10 pounds)  Encourage family to help participate in care of the  to allow rest and mom time to  heal  Episiotomy/Laceration  May use mary beth-spray bottle, witch hazel pads and dermaplast spray for comfort  Use mary beth-spray bottle after urinating to cleanse perineal area  To prevent burning during urination spray mary beth-water bottle on labial area   Pat perineal area dry until episiotomy/laceration is healed  Continue to use mary beth-bottle until bleeding stops as needed  If have a 2nd degree laceration or greater, a Sitz bath can offer relief from soreness, burning, and inflammation   Sitz Bath   Sit in 6 inches of warm water and soak laceration as needed until the laceration heals    VAGINAL CARE AND BLEEDING  Nothing inside vagina for 6 weeks:   No sexual intercourse, tampons or douching  Bleeding may continue for 2-4 weeks. Amount and color may vary  Soaking 1 pad or more in an hour for several hours is considered heavy bleeding  Passing large egg sized blood clots can be concerning  If you feel like you have heavy bleeding or are having increasing amount of blood clots call your Obstetrician immediately  If you begin feeling faint upon standing, feeling sick to your stomach, have clammy skin, a really fast heartbeat, have chills, start feeling confused, dizzy, sleepy or weak, or feeling like you're going to faint call your Obstetrician immediately    HYPERTENSION   Preeclampsia or gestational hypertension are types of high blood pressure that only pregnant women can get. It is important for you to be aware of symptoms to seek early intervention and treatment. If you have any of these symptoms immediately call your Obstetrician    Vision changes or blurred vision   Severe headache or pain that is unrelieved with medication and will not go away  Persistent pain in upper abdomen or shoulder   Increased swelling of face, feet, or hands  Difficulty breathing or shortness of breath at rest  Urinating less than usual    URINATION AND BOWEL MOVEMENTS  Eating more fiber (bran cereal, fruits, and vegetables) and drinking  "plenty of fluids will help to avoid constipation  Urinary frequency and urgency after childbirth is normal  If you experience any urinary pain, burning or frequency call your provider    BIRTH CONTROL  It is possible to become pregnant at any time after delivery and while breastfeeding  Plan to discuss a method of birth control with your physician at your post delivery follow up visit    POSTPARTUM BLUES  During the first few days after birth, you may experience a sense of the \"blues\" which may include impatience, irritability or even crying. These feelings come and go quickly. However, as many as 1 in 10 women experience emotional symptoms known as postpartum depression.     POSTPARTUM DEPRESSION    May start as early as the second or third day after delivery or take several weeks or months to develop. Symptoms of \"blues\" are present, but are more intense: Crying spells; loss of appetite; feelings of hopelessness or loss of control; fear of touching the baby; over concern or no concern at all about the baby; little or no concern about your own appearance/caring for yourself; and/or inability to sleep or excessive sleeping. Contact your Obstetrician if you are experiencing any of these symptoms     PREVENTING SHAKEN BABY  If you are angry or stressed, PUT THE BABY IN THE CRIB, step away, take some deep breaths, and wait until you are calm to care for the baby. DO NOT SHAKE THE BABY. You are not alone, call a supporter for help.  [facility] 24/7 crisis call line ([phone]) or (1-976.402.4991)  You can also text them, text \"ANSWER\" (443193)    Breastfeeding Plan:       Breastfeed baby on demand based on early feeding cues at least 8x in 24hrs. It is not recommended to let baby sleep longer than 4 hours between feedings and if sleepy, place skin to skin to promote feeding interest and milk production.  Baby's usually feed more frequently and longer when skin to skin with mother. Continue to hand express " prior to latch.       Expect cluster feeding as this is normal during early days of life and growth spurts. Baby may feed more frequently and for longer periods of time.  Its normal for a baby to want to feed up to 18x in24 hr period. This frequent feeding is activating hormones to make mature breastmilk.  Breastmilk should increase in volume by postpartum day 4.  Signs and symptoms of engorgement might occur around that time.     It is not recommended to use pacifiers or supplementing with formula until breast feeding and milk production is well established or at least 4 weeks.    Make sure infant is getting enough by ensuring frequent feedings as well as looking for transitioning stools from dark meconium to bright yellow/green seedy loose stools by day 5.     Follow up with PEDS PCP as scheduled for weight checks and to make sure feeding is progressing appropriately.    Engorgement care: frequent breastfeeding, gentle (like petting a cat) massage towards axilla, cool compresses, hand express to comfort and to soften nipple area for latch, NSAIDs (like ibuprofen) as prescribed by OB for postpartum pain relief. Contact your OB provider if you develop a hard, warm, reddened lump especially if you also have a fever, chills, aches as this is concerning for mastitis.      Create a comfortable and relaxing environment for breastfeeding, minimizing distractions and ensuring proper positioning for mom and baby. Hold baby skin to skin with mom or dad as much as possible when you are awake and alert, skin to skin promotes bonding and breastfeeding success.      Breastfeeding support is available!      [facility] holds a free [facility]feeding [place] every Thursday from 11am-12pm lead by a Lactation Consultant.  No appointment necessary.  Excludes holidays. Bring your baby!  [facility]  3rd floor conference room.

## 2025-06-06 NOTE — PROGRESS NOTES
1650- Report received by Hilda PHELPS (Charge RN).    1705- Discharge education and follow up information reviewed who verbalizes understanding and papers were signed. Prescribed meds and information provided, patient verbalized understanding.     1836- Left facility escorted by staff.

## 2025-06-07 NOTE — LACTATION NOTE
This note was copied from a baby's chart.  Initial Consult:     History:   MOB, Karlene, is a 35 y/o  s/p  at 39+0 wks on 2025 at 1746. AMA, anemia, h/o PPH with prior baby. Received postpartum progestin IUD. Received prophylactic misoprostol and TXA at delivery. Nuchal cord x 2.    Baby boy, Chin, had BW 3085 g (6 lbs, 12.8 oz).      History of BF: Confident grand multip.  all of her prior babies for 1 y each. Only difficulty was last baby preferred the right breast.     Report of Current Breastfeeding Status:   MOB recorded several breastfeeds for this baby since delivery, lasting about 10 mins each. Reports he is already feeding well from both breasts. Reports her breasts/nipples are non-tender.     She does not yet have WIC, but desires referral.     Breastfeeding Assistance:      WIC referral sent to [facility], and encouraged MOB to make use of their lactation support services.      [facility] Breastfeeding Resources handout provided and outpatient lactation care and support [employer] options reviewed.     Encouraged MOB to reach out via bedside RN if she would like latch assist/assessment later during her stay.    PLAN:    Skin to skin when either parent awake and alert.    Offer breast whenever hunger cues noted.    If baby sleeps more than 3 hours, wake baby and offer breast.    If baby not waking for feeding at least every 3 hours, hand express and spoon/cup feed back EBM to baby.

## 2025-07-14 ENCOUNTER — POST PARTUM (OUTPATIENT)
Dept: OBGYN | Facility: CLINIC | Age: 35
End: 2025-07-14
Payer: MEDICAID

## 2025-07-14 VITALS — SYSTOLIC BLOOD PRESSURE: 108 MMHG | WEIGHT: 180 LBS | BODY MASS INDEX: 32.92 KG/M2 | DIASTOLIC BLOOD PRESSURE: 70 MMHG

## 2025-07-14 DIAGNOSIS — Z97.5 IUD (INTRAUTERINE DEVICE) IN PLACE: ICD-10-CM

## 2025-07-14 PROBLEM — Z64.1 GRAND MULTIPARITY: Status: RESOLVED | Noted: 2025-04-08 | Resolved: 2025-07-14

## 2025-07-14 PROBLEM — Z34.90 ENCOUNTER FOR INDUCTION OF LABOR: Status: RESOLVED | Noted: 2025-06-05 | Resolved: 2025-07-14

## 2025-07-14 PROBLEM — Z30.2 REQUEST FOR STERILIZATION: Status: RESOLVED | Noted: 2025-04-08 | Resolved: 2025-07-14

## 2025-07-14 PROBLEM — O99.019 ANTEPARTUM ANEMIA: Status: RESOLVED | Noted: 2025-05-01 | Resolved: 2025-07-14

## 2025-07-14 PROBLEM — O09.523 AMA (ADVANCED MATERNAL AGE) MULTIGRAVIDA 35+, THIRD TRIMESTER: Status: RESOLVED | Noted: 2025-04-08 | Resolved: 2025-07-14

## 2025-07-14 PROCEDURE — 3078F DIAST BP <80 MM HG: CPT

## 2025-07-14 PROCEDURE — 58300 INSERT INTRAUTERINE DEVICE: CPT

## 2025-07-14 PROCEDURE — 0503F POSTPARTUM CARE VISIT: CPT

## 2025-07-14 PROCEDURE — 3074F SYST BP LT 130 MM HG: CPT

## 2025-07-14 ASSESSMENT — EDINBURGH POSTNATAL DEPRESSION SCALE (EPDS)
I HAVE LOOKED FORWARD WITH ENJOYMENT TO THINGS: AS MUCH AS I EVER DID
I HAVE FELT SCARED OR PANICKY FOR NO GOOD REASON: NO, NOT AT ALL
I HAVE BEEN SO UNHAPPY THAT I HAVE BEEN CRYING: NO, NEVER
I HAVE BEEN ABLE TO LAUGH AND SEE THE FUNNY SIDE OF THINGS: AS MUCH AS I ALWAYS COULD
I HAVE BEEN SO UNHAPPY THAT I HAVE HAD DIFFICULTY SLEEPING: NOT AT ALL
TOTAL SCORE: 0
I HAVE BLAMED MYSELF UNNECESSARILY WHEN THINGS WENT WRONG: NO, NEVER
THINGS HAVE BEEN GETTING ON TOP OF ME: NO, I HAVE BEEN COPING AS WELL AS EVER
I HAVE FELT SAD OR MISERABLE: NO, NOT AT ALL
I HAVE BEEN ANXIOUS OR WORRIED FOR NO GOOD REASON: NO, NOT AT ALL
THE THOUGHT OF HARMING MYSELF HAS OCCURRED TO ME: NEVER

## 2025-07-14 ASSESSMENT — FIBROSIS 4 INDEX: FIB4 SCORE: 1.13

## 2025-07-14 NOTE — PROGRESS NOTES
Subjective   Subjective:    Karlene Arriaza is a 34 y.o. female who presents for her postpartum exam 5 weeks following a spontaneous vaginal delivery. Her prenatal course was uncomplicated.  Her delivery was uncomplicated. Her method of feeding infant is breastfeeding. She had a post placental IUD placed at delivery but it fell out last week. Reports no sex prior to this appointment. Patient denies crying spells, irritability, or mood swings.     Pap due 2026    Eating a regular diet without difficulty.   Bowel movement are Normal.      Denies breast problems, dysuria, vaginal bleeding, vaginal itching      Problem List     Patient Active Problem List    Diagnosis Date Noted    Postpartum care following vaginal delivery 07/14/2025    ASCUS of cervix with negative high risk HPV 10/09/2023       Objective    Lab:  Recent Results (from the past 6 weeks)   Comp Metabolic Panel    Collection Time: 06/05/25 10:20 AM   Result Value Ref Range    Sodium 137 135 - 145 mmol/L    Potassium 3.5 (L) 3.6 - 5.5 mmol/L    Chloride 107 96 - 112 mmol/L    Co2 18 (L) 20 - 33 mmol/L    Anion Gap 12.0 7.0 - 16.0    Glucose 76 65 - 99 mg/dL    Bun 14 8 - 22 mg/dL    Creatinine 0.71 0.50 - 1.40 mg/dL    Calcium 8.7 8.5 - 10.5 mg/dL    Correct Calcium 9.3 8.5 - 10.5 mg/dL    AST(SGOT) 17 12 - 45 U/L    ALT(SGPT) 5 2 - 50 U/L    Alkaline Phosphatase 148 (H) 30 - 99 U/L    Total Bilirubin 0.5 0.1 - 1.5 mg/dL    Albumin 3.3 3.2 - 4.9 g/dL    Total Protein 6.8 6.0 - 8.2 g/dL    Globulin 3.5 1.9 - 3.5 g/dL    A-G Ratio 0.9 g/dL   CBC with differential    Collection Time: 06/05/25 10:20 AM   Result Value Ref Range    WBC 12.9 (H) 4.8 - 10.8 K/uL    RBC 3.85 (L) 4.20 - 5.40 M/uL    Hemoglobin 7.9 (L) 12.0 - 16.0 g/dL    Hematocrit 27.3 (L) 37.0 - 47.0 %    MCV 70.9 (L) 81.4 - 97.8 fL    MCH 20.5 (L) 27.0 - 33.0 pg    MCHC 28.9 (L) 32.2 - 35.5 g/dL    RDW 45.2 35.9 - 50.0 fL    Platelet Count 288 164 - 446 K/uL    MPV 9.9 9.0 - 12.9 fL     Neutrophils-Polys 72.60 (H) 44.00 - 72.00 %    Lymphocytes 14.70 (L) 22.00 - 41.00 %    Monocytes 5.30 0.00 - 13.40 %    Eosinophils 1.90 0.00 - 6.90 %    Basophils 0.60 0.00 - 1.80 %    Immature Granulocytes 4.90 (H) 0.00 - 0.90 %    Nucleated RBC 1.90 (H) 0.00 - 0.20 /100 WBC    Neutrophils (Absolute) 9.41 (H) 1.82 - 7.42 K/uL    Lymphs (Absolute) 1.90 1.00 - 4.80 K/uL    Monos (Absolute) 0.68 0.00 - 0.85 K/uL    Eos (Absolute) 0.24 0.00 - 0.51 K/uL    Baso (Absolute) 0.08 0.00 - 0.12 K/uL    Immature Granulocytes (abs) 0.63 (H) 0.00 - 0.11 K/uL    NRBC (Absolute) 0.24 K/uL   T.PALLIDUM AB NIURKA (Syphilis)    Collection Time: 06/05/25 10:20 AM   Result Value Ref Range    Syphilis, Treponemal Qual Non-Reactive Non-Reactive   COD - Adult (Type and Screen)    Collection Time: 06/05/25 10:20 AM   Result Value Ref Range    ABO Grouping Only A     Rh Grouping Only POS     Antibody Screen-Cod NEG     Component R       R7                  Red Blood Cells7    [id number]   transfused   06/06/25   09:39      Product Type Red Blood Cells LR Pheresis     Dispense Status transfused     Unit Number (Barcoded) [id number]     Product Code (Barcoded) Z9071X16     Blood Type (Barcoded) 6200    ESTIMATED GFR    Collection Time: 06/05/25 10:20 AM   Result Value Ref Range    GFR (CKD-EPI) 112 >60 mL/min/1.73 m 2   PROTEIN/CREAT RATIO URINE    Collection Time: 06/05/25  7:10 PM   Result Value Ref Range    Total Protein, Urine 31.4 (H) 0.0 - 15.0 mg/dL    Creatinine, Random Urine 171.00 mg/dL    Protein Creatinine Ratio 184 (H) 10 - 107 mg/g   CBC without differential- Once in AM regardless of delivery time    Collection Time: 06/06/25  6:49 AM   Result Value Ref Range    WBC 18.7 (H) 4.8 - 10.8 K/uL    RBC 3.00 (L) 4.20 - 5.40 M/uL    Hemoglobin 6.3 (L) 12.0 - 16.0 g/dL    Hematocrit 21.1 (L) 37.0 - 47.0 %    MCV 70.3 (L) 81.4 - 97.8 fL    MCH 21.0 (L) 27.0 - 33.0 pg    MCHC 29.9 (L) 32.2 - 35.5 g/dL    RDW 44.7 35.9 - 50.0 fL     Platelet Count 259 164 - 446 K/uL    MPV 9.8 9.0 - 12.9 fL   CBC WITHOUT DIFFERENTIAL    Collection Time: 06/06/25  3:10 PM   Result Value Ref Range    WBC 17.8 (H) 4.8 - 10.8 K/uL    RBC 3.31 (L) 4.20 - 5.40 M/uL    Hemoglobin 7.4 (L) 12.0 - 16.0 g/dL    Hematocrit 24.8 (L) 37.0 - 47.0 %    MCV 74.9 (L) 81.4 - 97.8 fL    MCH 22.4 (L) 27.0 - 33.0 pg    MCHC 29.8 (L) 32.2 - 35.5 g/dL    RDW 53.5 (H) 35.9 - 50.0 fL    Platelet Count 242 164 - 446 K/uL    MPV 9.7 9.0 - 12.9 fL     Vitals:    07/14/25 1300   BP: 108/70   Weight: 180 lb     Vitals:    07/14/25 1300   BP: 108/70     Objective    Well nourished female in no acute distress  A& O x 3  Respirations clear and non labored on room air  No edema noted and pulses WNL bilaterally in lower extremities; no claudication  BS x 4; no guarding or tenderness  Breasts: no erythema or discharge. No masses or tenderness.     Genitourinary: Pelvic exam was performed with patient supine. External genitalia with no abnormal pigmentation, labial fusion, rash, tenderness, lesion or injury to the labia bilaterally.       Assessment   Assessment:    1. Postpartum Exam  2. General Counseling and Advice on Contraception  3. Screening for Postpartum Depression    Plan   Plan:    1. Breastfeeding support   2. Continue PNV   3. Contraceptive counseling- Patient would like another IUD placed today. She is interested in a tubal ligation.   4. Discussed diet, exercise and resumption of sexual activity.  Discussed signs and symptoms of stress incontinence and reviewed pelvic floor exercises.    5. Follow up 1 month for IUD check and tubal counseling  6. Mirena IUD counseling given:     Urine pregnancy test negative and no unprotected sex in past two weeks.     Today the patient is counseled on the risks and benefits of IUD insertion. She is aware of other birth control options and prefers to use this method. I also discussed with the patient the risk of infection on insertion, and had asked  the patient to remain on pelvic rest for one week following the insertion. We also discussed the risk of IUD expulsion, the risk of uterine perforation and IUD migration. If the IUD does migrate the patient may require a separate procedure such as a laparoscopy to retrieve the migrated IUD. I also discussed the 1% risk of pregnancy with IUD use. I also discussed the side effects of Mirena which can be amenorrhea or dysfunctional uterine bleeding or spotting.  Patient had the opportunity to ask questions regarding insertion, risks and benefits, all questions are answered in their entirety.  Informed consent is signed.    Patient handout given on IUD insertion and post care instructions.     Risks: Irregular bleeding, pain during and after insertion, migration of device, expulsion of device, pregnancy (ectopic is more common in women with IUD's).  Benefits: decreased bleeding and cramping, 99.2% effective contraception, good for 5 years, concealed method, well tolerated by most recipients.   Insertion risks are: infection and perforation of the uterus, rare but can happen. Watch for fever, foul smelling discharge, heavy bleeding and abd pain not controlled with Ibuprofen.      See procedure note

## 2025-07-14 NOTE — PROCEDURES
IUD: Mirena is choice:      Procedure note  Urine pregnancy test is negative, informed consent was previously signed  A speculum was inserted into the vagina, the cervix was cleansed with Betadine swabs x3  Tenaculum was placed on the anterior lip of the cervix at 11:00 and 1:00   The uterus was sounded to 7 centimeters  The IUD is placed under sterile conditions.   The strings trimmed to approximately 3 cm  Tenaculum was removed from the cervix and hemostasis was achieved with pressure only  The patient tolerated the procedure well    Lot: [id number]  Exp: 8/2027  Patient is asked to followup in 4 weeks for IUD check. The patient is asked to remain on pelvic rest for 2-3 days.  Use a backup method for 7 days, condoms. She is asked to return sooner than 5-6 weeks for heavy vaginal bleeding uncontrolled pain or fever

## 2025-07-14 NOTE — PROGRESS NOTES
Pt here today for postpartum exam.  Delivery type: 6/5 vaginal   Currently :breast   Desired BCM: pt got liletta placed but IUD fell out last week. Would like to discuss BTL ,   LMP: n/a   Last pap: 9/20/23   Phone # [phone] (home)